# Patient Record
Sex: MALE | Race: WHITE | Employment: FULL TIME | URBAN - METROPOLITAN AREA
[De-identification: names, ages, dates, MRNs, and addresses within clinical notes are randomized per-mention and may not be internally consistent; named-entity substitution may affect disease eponyms.]

---

## 2018-02-11 PROBLEM — E66.9 OBESITY: Status: ACTIVE | Noted: 2018-02-11

## 2018-02-11 PROBLEM — E11.9 DM (DIABETES MELLITUS) (HCC): Status: ACTIVE | Noted: 2018-02-11

## 2018-02-11 PROBLEM — I10 HTN (HYPERTENSION): Status: ACTIVE | Noted: 2018-02-11

## 2018-02-11 PROBLEM — R06.02 SHORTNESS OF BREATH: Status: ACTIVE | Noted: 2018-02-11

## 2018-02-12 PROBLEM — I50.9 ACUTE CHF (HCC): Status: ACTIVE | Noted: 2018-02-12

## 2018-02-16 DIAGNOSIS — I42.8 NON-ISCHEMIC CARDIOMYOPATHY (HCC): Primary | ICD-10-CM

## 2018-02-16 DIAGNOSIS — R06.02 SHORTNESS OF BREATH: ICD-10-CM

## 2018-02-19 ENCOUNTER — TELEPHONE (OUTPATIENT)
Dept: TELEMETRY | Age: 53
End: 2018-02-19

## 2018-02-22 ENCOUNTER — OFFICE VISIT (OUTPATIENT)
Dept: CARDIOLOGY CLINIC | Age: 53
End: 2018-02-22

## 2018-02-22 VITALS
HEART RATE: 95 BPM | DIASTOLIC BLOOD PRESSURE: 62 MMHG | SYSTOLIC BLOOD PRESSURE: 116 MMHG | OXYGEN SATURATION: 99 % | WEIGHT: 240 LBS | HEIGHT: 72 IN | BODY MASS INDEX: 32.51 KG/M2

## 2018-02-22 DIAGNOSIS — I50.22 CHRONIC SYSTOLIC HEART FAILURE (HCC): ICD-10-CM

## 2018-02-22 DIAGNOSIS — I25.10 MILD CAD: ICD-10-CM

## 2018-02-22 DIAGNOSIS — R06.02 SHORTNESS OF BREATH: ICD-10-CM

## 2018-02-22 DIAGNOSIS — I42.8 NON-ISCHEMIC CARDIOMYOPATHY (HCC): Primary | ICD-10-CM

## 2018-02-22 DIAGNOSIS — I10 ESSENTIAL HYPERTENSION: ICD-10-CM

## 2018-02-22 DIAGNOSIS — E11.9 TYPE 2 DIABETES MELLITUS WITHOUT COMPLICATION, WITHOUT LONG-TERM CURRENT USE OF INSULIN (HCC): ICD-10-CM

## 2018-02-22 PROCEDURE — 99214 OFFICE O/P EST MOD 30 MIN: CPT | Performed by: NURSE PRACTITIONER

## 2018-02-22 RX ORDER — CARVEDILOL 6.25 MG/1
6.25 TABLET ORAL 2 TIMES DAILY WITH MEALS
Qty: 60 TABLET | Refills: 1 | Status: SHIPPED | OUTPATIENT
Start: 2018-02-26 | End: 2018-03-08 | Stop reason: SDUPTHER

## 2018-02-22 RX ORDER — VALSARTAN 40 MG/1
40 TABLET ORAL NIGHTLY
Qty: 90 TABLET | Refills: 1 | Status: SHIPPED | OUTPATIENT
Start: 2018-02-22 | End: 2018-03-08 | Stop reason: SDUPTHER

## 2018-02-22 NOTE — PROGRESS NOTES
distress  HEENT: non-icteric sclera, oropharynx without exudate, oral mucosa moist  Neck: JVP less than 8 cm H20  Respiratory:  · No use of accessory muscles  · Clear breath sounds throughout, no wheezing, no crackles, no rhonchi  Cardiovascular:  · The apical impulses not displaced; life vest in place  · Heart tones are crisp and normal, no murmur/rub/gallop  · Regular rate and rhythm, S1,S2 normal  · Radial pulses 2+ and equal bilaterally  · No edema  · Pedal Pulses: 2+ and equal   Abdomen:  · No masses or tenderness  · Liver: No Abnormalities Noted  Musculoskeletal/Skin:  · Exhibits normal gait balance and coordination  · There is no clubbing, cyanosis of the extremities  · Skin is warm and dry  · Moves all extremities well  Neurological/Psychiatric:  · Alert and oriented in all spheres  · No abnormalities of mood, affect, memory, mentation, or behavior are noted    Lab Data:  CBC:   Lab Results   Component Value Date    WBC 9.3 02/12/2018    WBC 9.3 02/11/2018    RBC 4.73 02/12/2018    RBC 5.02 02/11/2018    HGB 13.2 02/12/2018    HGB 14.1 02/11/2018    HCT 40.3 02/12/2018    HCT 43.1 02/11/2018    MCV 85.2 02/12/2018    MCV 85.9 02/11/2018    RDW 13.7 02/12/2018    RDW 13.8 02/11/2018     02/12/2018     02/11/2018     BMP:   Lab Results   Component Value Date     02/16/2018     02/15/2018     02/14/2018    K 4.3 02/16/2018    K 4.0 02/15/2018    K 3.6 02/14/2018    CL 93 02/16/2018    CL 89 02/15/2018    CL 92 02/14/2018    CO2 34 02/16/2018    CO2 32 02/15/2018    CO2 29 02/14/2018    BUN 25 02/16/2018    BUN 26 02/15/2018    BUN 25 02/14/2018    CREATININE 0.9 02/16/2018    CREATININE 1.0 02/15/2018    CREATININE 1.1 02/14/2018     BNP:   Lab Results   Component Value Date    PROBNP 935 02/16/2018    PROBNP 1,042 02/15/2018    PROBNP 1,651 02/14/2018       Recent Testing:  [facility] 2/13/18 ([place])  HEMODYNAMICS:  1.  PA 58/31, mean of 40, mean PCWP 27, RV 52, RVEDP 13, mean RA 11 ventricular size is moderately   dilated. Elevated left ventricular diastolic filling pressure. -- Moderate mitral regurgitation. No stenosis. -- Severely dilated left and right atria. -- The right ventricle is moderately enlarged. Right ventricular systolic   function is mildly reduced. TAPSE = 15 mm. Pertinent Problems:     Assessment/Plan:  · Bi-ventricular heart failure. Systolic and diastolic heart failure. NYHA Class II              - non-ischemic cardiomyopathy; ? Medication related as he was taking adderall for the last 3 years, history of body building and previous used steroids about 10 years ago per report. I recommended no adderall at discharge with cardiomyopathy              - LVEF 17%  · Mild CAD  · Pulmonary hypertension due to heart failure  · Hypertension  · diabetes    Visit Diagnosis:    1. Non-ischemic cardiomyopathy ([facility]    2. Shortness of breath    3. Chronic systolic heart failure ([facility]    4. Mild CAD    5. Essential hypertension    6. Type 2 diabetes mellitus without complication, without long-term current use of insulin [facility]        Plan:   1. Check BMP and BNP today and again next week  2. Increase valsartan to 40mg nightly for heart function  3. Increase coreg starting Monday to 6.25mg twice a day with food  4. Continue daily weights  5. Follow up 2 weeks for additional medication adjustment  6. Continue Life vest as ordered by Dr. Clayton Antoine for low EF. Will recommend repeat Echocardiogram in 90 days after titration of GDMT. If EF still <35% will have patient evaluated by EP for ICD. Core measures for HFrEF:  EF: 17%  ICD: Life vest  ACEi/ARB/ARNI: valsartan 40mg daily   BB: coreg 6.25 mg BID  Yeison: can consider adding  Hydralazine/nitrates: NA    QUALITY MEASURES  1. Tobacco Cessation Counseling: NA  2. Retake of BP if >140/90:   NA  3. Documentation to PCP/referring for new patient:  Sent to PCP at close of office visit  4. CAD patient on anti-platelet: Yes  5.  CAD patient on STATIN therapy:  Yes  6. Patient with CHF and aFib on anticoagulation:  NA     I appreciate the opportunity for caring for this patient.      India Guzman CNP, 2/22/2018, 2:57 PM

## 2018-02-22 NOTE — PATIENT INSTRUCTIONS
Plan:   1. Check BMP and BNP today and again next week  2. Increase valsartan to 40mg nightly for heart function  3. Increase coreg starting Monday to 6.25mg twice a day with food  4. Continue daily weights  5.  Follow up 2 weeks for additional medication adjustment

## 2018-02-28 ENCOUNTER — TELEPHONE (OUTPATIENT)
Dept: CARDIOLOGY CLINIC | Age: 53
End: 2018-02-28

## 2018-03-01 ENCOUNTER — TELEPHONE (OUTPATIENT)
Dept: CARDIOLOGY CLINIC | Age: 53
End: 2018-03-01

## 2018-03-05 ENCOUNTER — TELEPHONE (OUTPATIENT)
Dept: CARDIOLOGY CLINIC | Age: 53
End: 2018-03-05

## 2018-03-05 NOTE — TELEPHONE ENCOUNTER
Pt has gained 3 lb over 2 days. Pt was to go to work today but was not feeling well enough. Minor swelling feet. Denies SOB. NO B/P taken. No dizziness. Please advise.

## 2018-03-07 ENCOUNTER — TELEPHONE (OUTPATIENT)
Dept: CARDIOLOGY CLINIC | Age: 53
End: 2018-03-07

## 2018-03-08 ENCOUNTER — OFFICE VISIT (OUTPATIENT)
Dept: CARDIOLOGY CLINIC | Age: 53
End: 2018-03-08

## 2018-03-08 ENCOUNTER — HOSPITAL ENCOUNTER (OUTPATIENT)
Dept: OTHER | Age: 53
Discharge: HOME | End: 2018-03-31
Attending: NURSE PRACTITIONER | Admitting: NURSE PRACTITIONER

## 2018-03-08 VITALS
DIASTOLIC BLOOD PRESSURE: 72 MMHG | HEIGHT: 72 IN | WEIGHT: 240 LBS | HEART RATE: 106 BPM | BODY MASS INDEX: 32.51 KG/M2 | OXYGEN SATURATION: 98 % | SYSTOLIC BLOOD PRESSURE: 120 MMHG

## 2018-03-08 DIAGNOSIS — I10 ESSENTIAL HYPERTENSION: ICD-10-CM

## 2018-03-08 DIAGNOSIS — I42.8 NON-ISCHEMIC CARDIOMYOPATHY (HCC): Primary | ICD-10-CM

## 2018-03-08 DIAGNOSIS — I50.22 CHRONIC SYSTOLIC HEART FAILURE (HCC): ICD-10-CM

## 2018-03-08 DIAGNOSIS — R06.02 SHORTNESS OF BREATH: ICD-10-CM

## 2018-03-08 LAB
ANION GAP SERPL CALCULATED.3IONS-SCNC: 14 MMOL/L (ref 3–16)
BUN BLDV-MCNC: 16 MG/DL (ref 7–20)
CALCIUM SERPL-MCNC: 9.5 MG/DL (ref 8.3–10.6)
CHLORIDE BLD-SCNC: 97 MMOL/L (ref 99–110)
CO2: 29 MMOL/L (ref 21–32)
CREAT SERPL-MCNC: 0.8 MG/DL (ref 0.9–1.3)
GFR AFRICAN AMERICAN: >60
GFR NON-AFRICAN AMERICAN: >60
GLUCOSE BLD-MCNC: 261 MG/DL (ref 70–99)
POTASSIUM SERPL-SCNC: 4.9 MMOL/L (ref 3.5–5.1)
PRO-BNP: 1589 PG/ML (ref 0–124)
SODIUM BLD-SCNC: 140 MMOL/L (ref 136–145)

## 2018-03-08 PROCEDURE — 99214 OFFICE O/P EST MOD 30 MIN: CPT | Performed by: NURSE PRACTITIONER

## 2018-03-08 RX ORDER — VALSARTAN 80 MG/1
80 TABLET ORAL NIGHTLY
Qty: 30 TABLET | Refills: 1 | Status: SHIPPED | OUTPATIENT
Start: 2018-03-08 | End: 2018-03-20 | Stop reason: SDUPTHER

## 2018-03-08 RX ORDER — CARVEDILOL 12.5 MG/1
12.5 TABLET ORAL 2 TIMES DAILY WITH MEALS
Qty: 60 TABLET | Refills: 1 | Status: SHIPPED | OUTPATIENT
Start: 2018-03-08 | End: 2018-03-20 | Stop reason: SDUPTHER

## 2018-03-08 NOTE — PATIENT INSTRUCTIONS
1. Continue daily weight  2. Increase coreg to 12.5mg twice a day with food starting on Saturday (okay to take 2 tabs of the 6.25mg twice a day until you run low and then  script for the 12.5mg tablet)  3. Increase Valsartan 80mg nightly starting tonight   4. Repeat labs in 2 weeks   5.  Follow up with Dr. Alisia Ramirez as planned

## 2018-03-08 NOTE — PROGRESS NOTES
[address]   Cardiac Follow-up    Primary Care Doctor:  Sagrario Martin PA-C    Chief Complaint   Patient presents with    Cardiomyopathy        History of Present Illness:   I had the pleasure of seeing Mortimer Bar in follow up for  systolic heart failure, (LVEF 17%), non-ischemic cardiomyopathy, non-obstructive CAD, HTN, DM. Admitted from 2/11/18-2/16/18 with worsening shortness of breath, orthopnea, unable to work due to dyspnea. Echo showed decreased LVEF, cardiac cath as below. He was started on lasix infusion and milrinone with improved diuresis. Admission weight was 255lbs down to 228lbs at discharge. He was started on digoxin, low dose valsartan 20mg due to lower blood pressures, coreg 3.125mg BID. He was also discharged with a life vest due to severe cardiomyopathy. OV 2/22/18: Weight at home was 232lbs this morning. Home weight yesterday was 230lbs. Since last visit, he continues on valsartan 40mg daily and he continued on coreg 6.25mg BID. Home weight was 238lbs. He is trying to stay active at the gym, started with some leg swelling recently in the last 1 week. He isn't on a water pill. He hasn't increased the valsartan 80mg daily yet. No lightheadedness or dizziness. No shortness of breath. No palpitations. Some fatigue. He is watching his salt intake and his fluid intake. Mortimer Bar describes symptoms including some fatigue but denies chest pain, dyspnea, palpitations, orthopnea, early saiety, syncope. NYHA:   II  ACC/ AHA Stage:    C    Past Medical History:   has a past medical history of Acute CHF ([facility] Coronary artery disease involving native coronary artery of native heart without angina pectoris; Diabetes mellitus ([facility] [facility] Hypertension; and Systolic heart failure ([facility] [facility] Surgical History:   has no past surgical history on file. Social History:   reports that he has never smoked.  He has never used smokeless tobacco. He reports that he does not (1.829 m)        Constitutional and General Appearance: no apparent distress  HEENT: non-icteric sclera, oropharynx without exudate, oral mucosa moist  Neck: JVP less than 8 cm H20  Respiratory:  · No use of accessory muscles  · Clear breath sounds throughout, no wheezing, no crackles, no rhonchi  Cardiovascular:  · The apical impulses not displaced; life vest in place  · Heart tones are crisp and normal, no murmur/rub/gallop  · Regular rate and rhythm, S1,S2 normal  · Radial pulses 2+ and equal bilaterally  · Trace left leg edema, none on the right  · Pedal Pulses: 2+ and equal   Abdomen:  · No masses or tenderness  · Liver: No Abnormalities Noted  Musculoskeletal/Skin:  · Exhibits normal gait balance and coordination  · There is no clubbing, cyanosis of the extremities  · Skin is warm and dry  · Moves all extremities well  Neurological/Psychiatric:  · Alert and oriented in all spheres  · No abnormalities of mood, affect, memory, mentation, or behavior are noted    Lab Data:  CBC:   Lab Results   Component Value Date    WBC 9.3 02/12/2018    WBC 9.3 02/11/2018    RBC 4.73 02/12/2018    RBC 5.02 02/11/2018    HGB 13.2 02/12/2018    HGB 14.1 02/11/2018    HCT 40.3 02/12/2018    HCT 43.1 02/11/2018    MCV 85.2 02/12/2018    MCV 85.9 02/11/2018    RDW 13.7 02/12/2018    RDW 13.8 02/11/2018     02/12/2018     02/11/2018     BMP:   Lab Results   Component Value Date     02/16/2018     02/15/2018     02/14/2018    K 4.3 02/16/2018    K 4.0 02/15/2018    K 3.6 02/14/2018    CL 93 02/16/2018    CL 89 02/15/2018    CL 92 02/14/2018    CO2 34 02/16/2018    CO2 32 02/15/2018    CO2 29 02/14/2018    BUN 25 02/16/2018    BUN 26 02/15/2018    BUN 25 02/14/2018    CREATININE 0.9 02/16/2018    CREATININE 1.0 02/15/2018    CREATININE 1.1 02/14/2018     BNP:   Lab Results   Component Value Date    PROBNP 935 02/16/2018    PROBNP 1,042 02/15/2018    PROBNP 1,651 02/14/2018       Recent Testing:  [facility] 2/13/18 ([place])  HEMODYNAMICS:  1.  PA 58/31, mean of 40, mean PCWP 27, RV 52, RVEDP 13, mean RA 11 mmHg. 2.  LVEDP was 26, , /87, mean of 101 mmHg. 3.  Saturation AO 98%, PA 56%, RA 59%, SVC 57%. 4.  Cardiac index by Edinson 1.7 L/min/m2, cardiac output 4 L/min. 5.  PVR 3.2 Woods units and SVR was 1420 dynes per centimeter-second. LV gram was not done due to the recent estimation of the ejection fraction. EF on the echocardiogram done yesterday was 17%. CORONARY ANGIOGRAPHY:  1. The left main was a large-caliber vessel that trifurcated, it was  normal.     2.  The left circumflex was a medium-caliber vessel that gave off two  obtuse marginal branches, the first was medium-caliber, the second was  large. First OM had luminal irregularities. 3.  LAD was large caliber vessel that reached the apex. There were luminal  irregularities noted in the mid LAD. 4.  Ramus was a large-caliber vessel that had 20% ostial stenosis. 5.  Right coronary artery was large and dominant and had 40% mid segment  stenosis. It gave off a medium-caliber right posterior descending artery  and three small right posterolateral branches. A small caliber right  ventricular branch also supplied right posterior descending artery  territory     IMPRESSION:  1. Mild CAD of mid RCA, first OM and mid LAD. 2.  Nonischemic dilated cardiomyopathy with EF of 17% on echocardiogram.  3.  Severe pulmonary hypertension. 4.  Moderately elevated left ventricular filling pressure. 5.  Moderately elevated right-sided filling pressures. 6.  Severely reduced cardiac index. 7.  PVR and PCWP suggest pulmonary hypertension is of mixed etiology of   pulmonary disease as well as left heart failure. Echo 2/12/18  Summary   There is no echo for comparison. -- The left ventricular systolic function is severely reduced with an   ejection fraction of 17 % by Saba's. Visually LVEF appears to be 10-15%.    There is global visit  4. CAD patient on anti-platelet: Yes  5. CAD patient on STATIN therapy:  Yes  6. Patient with CHF and aFib on anticoagulation:  NA     I appreciate the opportunity for caring for this patient.      Temo Henao CNP, 3/8/2018, 2:47 PM

## 2018-03-08 NOTE — LETTER
[address] Cardiology - [address] [place] [id number] [place] [address]. [id number]  Phone: [phone]  Fax: [phone]    Fercho Mendes CNP        March 13, 2018     Jesus Manuel Church, [address] [place] [id number]    Patient: Mame Vora  MR Number: [id number]  YOB: 1965  Date of Visit: 3/8/2018    Dear Dr. Jesus Manuel Church:    Thank you for the request for consultation for Gail Vo to me for the evaluation. Below are the relevant portions of my assessment and plan of care. [address]   Cardiac Follow-up    Primary Care Doctor:  Jesus Manuel Church PA-C    Chief Complaint   Patient presents with    Cardiomyopathy        History of Present Illness:   I had the pleasure of seeing Mame Vora in follow up for  systolic heart failure, (LVEF 17%), non-ischemic cardiomyopathy, non-obstructive CAD, HTN, DM. Admitted from 2/11/18-2/16/18 with worsening shortness of breath, orthopnea, unable to work due to dyspnea. Echo showed decreased LVEF, cardiac cath as below. He was started on lasix infusion and milrinone with improved diuresis. Admission weight was 255lbs down to 228lbs at discharge. He was started on digoxin, low dose valsartan 20mg due to lower blood pressures, coreg 3.125mg BID. He was also discharged with a life vest due to severe cardiomyopathy. OV 2/22/18: Weight at home was 232lbs this morning. Home weight yesterday was 230lbs. Since last visit, he continues on valsartan 40mg daily and he continued on coreg 6.25mg BID. Home weight was 238lbs. He is trying to stay active at the gym, started with some leg swelling recently in the last 1 week. He isn't on a water pill. He hasn't increased the valsartan 80mg daily yet. No lightheadedness or dizziness. No shortness of breath. No palpitations. Some fatigue. He is watching his salt intake and his fluid intake.      Mame Vora describes symptoms including some fatigue but denies  02/16/2018     02/15/2018     02/14/2018    K 4.3 02/16/2018    K 4.0 02/15/2018    K 3.6 02/14/2018    CL 93 02/16/2018    CL 89 02/15/2018    CL 92 02/14/2018    CO2 34 02/16/2018    CO2 32 02/15/2018    CO2 29 02/14/2018    BUN 25 02/16/2018    BUN 26 02/15/2018    BUN 25 02/14/2018    CREATININE 0.9 02/16/2018    CREATININE 1.0 02/15/2018    CREATININE 1.1 02/14/2018     BNP:   Lab Results   Component Value Date    PROBNP 935 02/16/2018    PROBNP 1,042 02/15/2018    PROBNP 1,651 02/14/2018       Recent Testing:  [facility] 2/13/18 ([place])  HEMODYNAMICS:  1.  PA 58/31, mean of 40, mean PCWP 27, RV 52, RVEDP 13, mean RA 11 mmHg. 2.  LVEDP was 26, , /87, mean of 101 mmHg. 3.  Saturation AO 98%, PA 56%, RA 59%, SVC 57%. 4.  Cardiac index by Edinson 1.7 L/min/m2, cardiac output 4 L/min. 5.  PVR 3.2 Woods units and SVR was 1420 dynes per centimeter-second. LV gram was not done due to the recent estimation of the ejection fraction. EF on the echocardiogram done yesterday was 17%. CORONARY ANGIOGRAPHY:  1. The left main was a large-caliber vessel that trifurcated, it was  normal.     2.  The left circumflex was a medium-caliber vessel that gave off two  obtuse marginal branches, the first was medium-caliber, the second was  large. First OM had luminal irregularities. 3.  LAD was large caliber vessel that reached the apex. There were luminal  irregularities noted in the mid LAD. 4.  Ramus was a large-caliber vessel that had 20% ostial stenosis. 5.  Right coronary artery was large and dominant and had 40% mid segment  stenosis. It gave off a medium-caliber right posterior descending artery  and three small right posterolateral branches. A small caliber right  ventricular branch also supplied right posterior descending artery  territory     IMPRESSION:  1. Mild CAD of mid RCA, first OM and mid LAD.   2.  Nonischemic dilated cardiomyopathy with EF of 17% on echocardiogram. 3.  Severe pulmonary hypertension. 4.  Moderately elevated left ventricular filling pressure. 5.  Moderately elevated right-sided filling pressures. 6.  Severely reduced cardiac index. 7.  PVR and PCWP suggest pulmonary hypertension is of mixed etiology of   pulmonary disease as well as left heart failure. Echo 2/12/18  Summary   There is no echo for comparison. -- The left ventricular systolic function is severely reduced with an   ejection fraction of 17 % by Saba's. Visually LVEF appears to be 10-15%. There is global hypokinesis with regional variations. There is mild   concentric left ventricular hypertrophy. Left ventricular size is moderately   dilated. Elevated left ventricular diastolic filling pressure. -- Moderate mitral regurgitation. No stenosis. -- Severely dilated left and right atria. -- The right ventricle is moderately enlarged. Right ventricular systolic   function is mildly reduced. TAPSE = 15 mm. Pertinent Problems:     Assessment/Plan:  · Bi-ventricular heart failure. Systolic and diastolic heart failure. NYHA Class II              - non-ischemic cardiomyopathy; ? Medication related as he was taking adderall for the last 3 years, history of body building and previous used steroids about 10 years ago per report. I recommended no adderall at discharge with cardiomyopathy              - LVEF 17%  · Mild CAD  · Pulmonary hypertension due to heart failure  · Hypertension  · diabetes    Visit Diagnosis:    1. Non-ischemic cardiomyopathy ([address]    2. Essential hypertension    3. Chronic systolic heart failure ([address]    4. Shortness of breath        Plan:   1. Continue daily weight  2. Increase coreg to 12.5mg twice a day with food starting on Saturday (okay to take 2 tabs of the 6.25mg twice a day until you run low and then  script for the 12.5mg tablet)  3. Increase Valsartan 80mg nightly starting tonight   4.  Repeat labs in 2 weeks 5. Follow up with Dr. Phillip Valencia as planned   Continue Life vest as ordered by Dr. Brenden Curran for low EF. Will recommend repeat Echocardiogram in 90 days after titration of GDMT. If EF still <35% will have patient evaluated by EP for ICD. Core measures for HFrEF:  EF: 17%  ICD: Life vest  ACEi/ARB/ARNI: valsartan 80mg daily   BB: coreg 12.5 mg BID  Yeison: can consider adding  Hydralazine/nitrates: NA    QUALITY MEASURES  1. Tobacco Cessation Counseling: NA  2. Retake of BP if >140/90:   NA  3. Documentation to PCP/referring for new patient:  Sent to PCP at close of office visit  4. CAD patient on anti-platelet: Yes  5. CAD patient on STATIN therapy:  Yes  6. Patient with CHF and aFib on anticoagulation:  NA     I appreciate the opportunity for caring for this patient. Navin Bello CNP, 3/8/2018, 2:47 PM      If you have questions, please do not hesitate to call me. I look forward to following Alejandra Macias along with you.     Sincerely,        Navin Bello CNP

## 2018-03-12 ENCOUNTER — TELEPHONE (OUTPATIENT)
Dept: CARDIOLOGY CLINIC | Age: 53
End: 2018-03-12

## 2018-03-12 NOTE — TELEPHONE ENCOUNTER
----- Message from Reyna Fuller CNP sent at 3/9/2018  3:25 PM EST -----  Heart failure number is up. Kidney labs look good. He needs to decrease weight lifting to NO more than 30lbs due to heart function and worsening number.

## 2018-03-13 NOTE — COMMUNICATION BODY
2/13/18 ([place])  HEMODYNAMICS:  1.  PA 58/31, mean of 40, mean PCWP 27, RV 52, RVEDP 13, mean RA 11 mmHg. 2.  LVEDP was 26, , /87, mean of 101 mmHg. 3.  Saturation AO 98%, PA 56%, RA 59%, SVC 57%. 4.  Cardiac index by Edinson 1.7 L/min/m2, cardiac output 4 L/min. 5.  PVR 3.2 Woods units and SVR was 1420 dynes per centimeter-second. LV gram was not done due to the recent estimation of the ejection fraction. EF on the echocardiogram done yesterday was 17%. CORONARY ANGIOGRAPHY:  1. The left main was a large-caliber vessel that trifurcated, it was  normal.     2.  The left circumflex was a medium-caliber vessel that gave off two  obtuse marginal branches, the first was medium-caliber, the second was  large. First OM had luminal irregularities. 3.  LAD was large caliber vessel that reached the apex. There were luminal  irregularities noted in the mid LAD. 4.  Ramus was a large-caliber vessel that had 20% ostial stenosis. 5.  Right coronary artery was large and dominant and had 40% mid segment  stenosis. It gave off a medium-caliber right posterior descending artery  and three small right posterolateral branches. A small caliber right  ventricular branch also supplied right posterior descending artery  territory     IMPRESSION:  1. Mild CAD of mid RCA, first OM and mid LAD. 2.  Nonischemic dilated cardiomyopathy with EF of 17% on echocardiogram.  3.  Severe pulmonary hypertension. 4.  Moderately elevated left ventricular filling pressure. 5.  Moderately elevated right-sided filling pressures. 6.  Severely reduced cardiac index. 7.  PVR and PCWP suggest pulmonary hypertension is of mixed etiology of   pulmonary disease as well as left heart failure. Echo 2/12/18  Summary   There is no echo for comparison. -- The left ventricular systolic function is severely reduced with an   ejection fraction of 17 % by Saba's. Visually LVEF appears to be 10-15%.    There is global visit  4. CAD patient on anti-platelet: Yes  5. CAD patient on STATIN therapy:  Yes  6. Patient with CHF and aFib on anticoagulation:  NA     I appreciate the opportunity for caring for this patient.      Surendra Collins CNP, 3/8/2018, 2:47 PM

## 2018-03-19 LAB
ANION GAP SERPL CALCULATED.3IONS-SCNC: 18 MMOL/L (ref 3–16)
BUN BLDV-MCNC: 19 MG/DL (ref 7–20)
CALCIUM SERPL-MCNC: 9 MG/DL (ref 8.3–10.6)
CHLORIDE BLD-SCNC: 96 MMOL/L (ref 99–110)
CO2: 25 MMOL/L (ref 21–32)
CREAT SERPL-MCNC: 0.7 MG/DL (ref 0.9–1.3)
GFR AFRICAN AMERICAN: >60
GFR NON-AFRICAN AMERICAN: >60
GLUCOSE BLD-MCNC: 291 MG/DL (ref 70–99)
POTASSIUM SERPL-SCNC: 5 MMOL/L (ref 3.5–5.1)
PRO-BNP: 679 PG/ML (ref 0–124)
SODIUM BLD-SCNC: 139 MMOL/L (ref 136–145)

## 2018-03-20 ENCOUNTER — OFFICE VISIT (OUTPATIENT)
Dept: CARDIOLOGY CLINIC | Age: 53
End: 2018-03-20

## 2018-03-20 ENCOUNTER — TELEPHONE (OUTPATIENT)
Dept: CARDIOLOGY CLINIC | Age: 53
End: 2018-03-20

## 2018-03-20 VITALS
HEART RATE: 81 BPM | BODY MASS INDEX: 32.51 KG/M2 | HEIGHT: 72 IN | OXYGEN SATURATION: 99 % | WEIGHT: 240 LBS | DIASTOLIC BLOOD PRESSURE: 84 MMHG | SYSTOLIC BLOOD PRESSURE: 130 MMHG

## 2018-03-20 DIAGNOSIS — I25.10 CORONARY ARTERY DISEASE INVOLVING NATIVE CORONARY ARTERY OF NATIVE HEART WITHOUT ANGINA PECTORIS: ICD-10-CM

## 2018-03-20 DIAGNOSIS — I10 ESSENTIAL HYPERTENSION: ICD-10-CM

## 2018-03-20 DIAGNOSIS — I42.8 NON-ISCHEMIC CARDIOMYOPATHY (HCC): ICD-10-CM

## 2018-03-20 DIAGNOSIS — I50.22 SYSTOLIC CHF, CHRONIC (HCC): Primary | ICD-10-CM

## 2018-03-20 DIAGNOSIS — R06.02 SHORTNESS OF BREATH: ICD-10-CM

## 2018-03-20 PROBLEM — I50.9 ACUTE CONGESTIVE HEART FAILURE (HCC): Status: RESOLVED | Noted: 2018-02-12 | Resolved: 2018-03-20

## 2018-03-20 PROCEDURE — 99204 OFFICE O/P NEW MOD 45 MIN: CPT | Performed by: INTERNAL MEDICINE

## 2018-03-20 RX ORDER — ATORVASTATIN CALCIUM 40 MG/1
40 TABLET, FILM COATED ORAL NIGHTLY
Qty: 90 TABLET | Refills: 3 | Status: SHIPPED | OUTPATIENT
Start: 2018-03-20 | End: 2019-02-26 | Stop reason: SDUPTHER

## 2018-03-20 RX ORDER — CARVEDILOL 12.5 MG/1
12.5 TABLET ORAL 2 TIMES DAILY WITH MEALS
Qty: 180 TABLET | Refills: 3 | Status: SHIPPED | OUTPATIENT
Start: 2018-03-20 | End: 2019-02-26 | Stop reason: SDUPTHER

## 2018-03-20 RX ORDER — DIGOXIN 125 MCG
125 TABLET ORAL DAILY
Qty: 90 TABLET | Refills: 3 | Status: SHIPPED | OUTPATIENT
Start: 2018-03-20 | End: 2018-11-27 | Stop reason: SDUPTHER

## 2018-03-20 RX ORDER — VALSARTAN 160 MG/1
160 TABLET ORAL NIGHTLY
Qty: 90 TABLET | Refills: 3 | Status: SHIPPED | OUTPATIENT
Start: 2018-03-20 | End: 2018-11-27 | Stop reason: SDUPTHER

## 2018-03-20 NOTE — PROGRESS NOTES
[address]  Advanced CHF/Pulmonary Hypertension   Cardiac Evaluation      Pati Smith  YOB: 1965    Date of Visit:  3/20/18      Chief Complaint   Patient presents with    New Patient     pt is wearing a lifevest    Coronary Artery Disease    Hypertension    Cardiomyopathy    Discuss Labs     bnp,bmp 3/8/2018    Fatigue     pt has been napping duringthe day. He does go to the gym with 30 lb weight limit. History of Present Illness:  Pati Smith is a 47 y.o. male who presents for hospital follow up and for consultation and management of CM, CAD and HTN referred by Dr. Erika Aranda. Admitted from 2/11/18-2/16/18 with worsening shortness of breath, orthopnea, unable to work due to dyspnea. Echo showed decreased LVEF, cardiac cath as below. He was started on lasix infusion and milrinone with improved diuresis. Admission weight was 255lbs down to 228lbs at discharge. He was started on digoxin, low dose valsartan 20mg due to lower blood pressures, coreg 3.125mg BID. He was also discharged with a life vest due to severe cardiomyopathy. LHC was performed on 2/13/18 by Dr. Erika Aranda. Echo on 2/18/18 shows EF of 10-15%. Today he reports he is feeling ok. He reports not taking his Adderall any longer. He continues to work out and reports only lifting 30 lbs. He is watching his diet/sodium more closely. He continues to wear the life vest. He is wanting to go back to work. He is a . He denies chest pain, sob, palpitations, dizziness or syncope.      No Known Allergies  Current Outpatient Prescriptions   Medication Sig Dispense Refill    carvedilol (COREG) 12.5 MG tablet Take 1 tablet by mouth 2 times daily (with meals) 60 tablet 1    valsartan (DIOVAN) 80 MG tablet Take 1 tablet by mouth nightly 30 tablet 1    aspirin 81 MG EC tablet Take 1 tablet by mouth daily 30 tablet 0    atorvastatin (LIPITOR) 40 MG tablet Take 1 tablet by mouth nightly 30 tablet 0   

## 2018-03-23 ENCOUNTER — TELEPHONE (OUTPATIENT)
Dept: CARDIOLOGY CLINIC | Age: 53
End: 2018-03-23

## 2018-03-23 NOTE — TELEPHONE ENCOUNTER
pt needs work restriction letter fax to his employer for when he returns on 04/2/2018 fax to [phone]

## 2018-03-23 NOTE — TELEPHONE ENCOUNTER
I thought we took care of this at the visit. Go ahead and send letter. See weight restrictions in note.   MARSHALL

## 2018-03-23 NOTE — LETTER
[address] [place] [address] - [address] [facility] [address]  Phone: [phone]  Fax: [phone]    Raphael Bloom MD        March 23, 2018     [address] [place] 47 y.o. male 1965 may return to work 4/2/2018. He may not lift more than 30 pounds. If you have any questions or concerns, please don't hesitate to call.     Sincerely,      Raphael Bloom MD

## 2018-03-23 NOTE — COMMUNICATION BODY
[address]  Advanced CHF/Pulmonary Hypertension   Cardiac Evaluation      Glenn Garcia  YOB: 1965    Date of Visit:  3/20/18      Chief Complaint   Patient presents with    New Patient     pt is wearing a lifevest    Coronary Artery Disease    Hypertension    Cardiomyopathy    Discuss Labs     bnp,bmp 3/8/2018    Fatigue     pt has been napping duringthe day. He does go to the gym with 30 lb weight limit. History of Present Illness:  Glenn Garcia is a 47 y.o. male who presents for hospital follow up and for consultation and management of CM, CAD and HTN referred by Dr. Max Johnson. Admitted from 2/11/18-2/16/18 with worsening shortness of breath, orthopnea, unable to work due to dyspnea. Echo showed decreased LVEF, cardiac cath as below. He was started on lasix infusion and milrinone with improved diuresis. Admission weight was 255lbs down to 228lbs at discharge. He was started on digoxin, low dose valsartan 20mg due to lower blood pressures, coreg 3.125mg BID. He was also discharged with a life vest due to severe cardiomyopathy. LHC was performed on 2/13/18 by Dr. Max Johnson. Echo on 2/18/18 shows EF of 10-15%. Today he reports he is feeling ok. He reports not taking his Adderall any longer. He continues to work out and reports only lifting 30 lbs. He is watching his diet/sodium more closely. He continues to wear the life vest. He is wanting to go back to work. He is a . He denies chest pain, sob, palpitations, dizziness or syncope.      No Known Allergies  Current Outpatient Prescriptions   Medication Sig Dispense Refill    carvedilol (COREG) 12.5 MG tablet Take 1 tablet by mouth 2 times daily (with meals) 60 tablet 1    valsartan (DIOVAN) 80 MG tablet Take 1 tablet by mouth nightly 30 tablet 1    aspirin 81 MG EC tablet Take 1 tablet by mouth daily 30 tablet 0    atorvastatin (LIPITOR) 40 MG tablet Take 1 tablet by mouth nightly 30 tablet 0   

## 2018-03-27 LAB
ANION GAP SERPL CALCULATED.3IONS-SCNC: 16 MMOL/L (ref 3–16)
BUN BLDV-MCNC: 14 MG/DL (ref 7–20)
CALCIUM SERPL-MCNC: 9.3 MG/DL (ref 8.3–10.6)
CHLORIDE BLD-SCNC: 96 MMOL/L (ref 99–110)
CO2: 27 MMOL/L (ref 21–32)
CREAT SERPL-MCNC: 0.7 MG/DL (ref 0.9–1.3)
GFR AFRICAN AMERICAN: >60
GFR NON-AFRICAN AMERICAN: >60
GLUCOSE BLD-MCNC: 229 MG/DL (ref 70–99)
POTASSIUM SERPL-SCNC: 4.9 MMOL/L (ref 3.5–5.1)
PRO-BNP: 265 PG/ML (ref 0–124)
SODIUM BLD-SCNC: 139 MMOL/L (ref 136–145)

## 2018-03-28 ENCOUNTER — TELEPHONE (OUTPATIENT)
Dept: CARDIOLOGY CLINIC | Age: 53
End: 2018-03-28

## 2018-03-28 NOTE — TELEPHONE ENCOUNTER
Attempted to contact pt. Pt was not accepting calls at this time. Will try to contact at a later date and time. Will relay lab results per [facility] once we get a hold of [place].

## 2018-04-01 ENCOUNTER — HOSPITAL ENCOUNTER (OUTPATIENT)
Dept: OTHER | Age: 53
Discharge: HOME | End: 2018-04-30
Attending: NURSE PRACTITIONER | Admitting: NURSE PRACTITIONER

## 2018-04-02 LAB
ANION GAP SERPL CALCULATED.3IONS-SCNC: 13 MMOL/L (ref 3–16)
BUN BLDV-MCNC: 16 MG/DL (ref 7–20)
CALCIUM SERPL-MCNC: 9.2 MG/DL (ref 8.3–10.6)
CHLORIDE BLD-SCNC: 97 MMOL/L (ref 99–110)
CO2: 27 MMOL/L (ref 21–32)
CREAT SERPL-MCNC: 0.8 MG/DL (ref 0.9–1.3)
GFR AFRICAN AMERICAN: >60
GFR NON-AFRICAN AMERICAN: >60
GLUCOSE BLD-MCNC: 174 MG/DL (ref 70–99)
POTASSIUM SERPL-SCNC: 4.7 MMOL/L (ref 3.5–5.1)
PRO-BNP: 714 PG/ML (ref 0–124)
SODIUM BLD-SCNC: 137 MMOL/L (ref 136–145)

## 2018-04-03 ENCOUNTER — TELEPHONE (OUTPATIENT)
Dept: CARDIOLOGY CLINIC | Age: 53
End: 2018-04-03

## 2018-04-05 ENCOUNTER — TELEPHONE (OUTPATIENT)
Dept: CARDIOLOGY CLINIC | Age: 53
End: 2018-04-05

## 2018-04-05 RX ORDER — FUROSEMIDE 20 MG/1
20 TABLET ORAL
Qty: 30 TABLET | Refills: 0 | Status: SHIPPED | OUTPATIENT
Start: 2018-04-06 | End: 2018-06-09 | Stop reason: SDUPTHER

## 2018-04-09 ENCOUNTER — TELEPHONE (OUTPATIENT)
Dept: CARDIOLOGY CLINIC | Age: 53
End: 2018-04-09

## 2018-04-09 LAB
ANION GAP SERPL CALCULATED.3IONS-SCNC: 14 MMOL/L (ref 3–16)
BUN BLDV-MCNC: 15 MG/DL (ref 7–20)
CALCIUM SERPL-MCNC: 9.4 MG/DL (ref 8.3–10.6)
CHLORIDE BLD-SCNC: 95 MMOL/L (ref 99–110)
CO2: 28 MMOL/L (ref 21–32)
CREAT SERPL-MCNC: 0.8 MG/DL (ref 0.9–1.3)
GFR AFRICAN AMERICAN: >60
GFR NON-AFRICAN AMERICAN: >60
GLUCOSE BLD-MCNC: 225 MG/DL (ref 70–99)
POTASSIUM SERPL-SCNC: 5.1 MMOL/L (ref 3.5–5.1)
PRO-BNP: 188 PG/ML (ref 0–124)
SODIUM BLD-SCNC: 137 MMOL/L (ref 136–145)

## 2018-04-10 ENCOUNTER — TELEPHONE (OUTPATIENT)
Dept: CARDIOLOGY CLINIC | Age: 53
End: 2018-04-10

## 2018-04-11 ENCOUNTER — TELEPHONE (OUTPATIENT)
Dept: CARDIOLOGY CLINIC | Age: 53
End: 2018-04-11

## 2018-04-16 LAB
ANION GAP SERPL CALCULATED.3IONS-SCNC: 14 MMOL/L (ref 3–16)
BUN BLDV-MCNC: 11 MG/DL (ref 7–20)
CALCIUM SERPL-MCNC: 9.5 MG/DL (ref 8.3–10.6)
CHLORIDE BLD-SCNC: 96 MMOL/L (ref 99–110)
CO2: 28 MMOL/L (ref 21–32)
CREAT SERPL-MCNC: 0.8 MG/DL (ref 0.9–1.3)
GFR AFRICAN AMERICAN: >60
GFR NON-AFRICAN AMERICAN: >60
GLUCOSE BLD-MCNC: 282 MG/DL (ref 70–99)
POTASSIUM SERPL-SCNC: 4.6 MMOL/L (ref 3.5–5.1)
PRO-BNP: 361 PG/ML (ref 0–124)
SODIUM BLD-SCNC: 138 MMOL/L (ref 136–145)

## 2018-04-17 ENCOUNTER — OFFICE VISIT (OUTPATIENT)
Dept: CARDIOLOGY CLINIC | Age: 53
End: 2018-04-17

## 2018-04-17 VITALS
HEIGHT: 72 IN | DIASTOLIC BLOOD PRESSURE: 84 MMHG | SYSTOLIC BLOOD PRESSURE: 136 MMHG | HEART RATE: 95 BPM | OXYGEN SATURATION: 96 % | BODY MASS INDEX: 34.73 KG/M2 | WEIGHT: 256.4 LBS

## 2018-04-17 DIAGNOSIS — I25.10 CORONARY ARTERY DISEASE INVOLVING NATIVE CORONARY ARTERY OF NATIVE HEART WITHOUT ANGINA PECTORIS: ICD-10-CM

## 2018-04-17 DIAGNOSIS — I50.22 SYSTOLIC CHF, CHRONIC (HCC): Primary | ICD-10-CM

## 2018-04-17 DIAGNOSIS — I42.8 NON-ISCHEMIC CARDIOMYOPATHY (HCC): ICD-10-CM

## 2018-04-17 PROCEDURE — 99214 OFFICE O/P EST MOD 30 MIN: CPT | Performed by: INTERNAL MEDICINE

## 2018-05-01 ENCOUNTER — HOSPITAL ENCOUNTER (OUTPATIENT)
Dept: OTHER | Age: 53
Discharge: HOME | End: 2018-05-31
Attending: NURSE PRACTITIONER | Admitting: NURSE PRACTITIONER

## 2018-05-16 PROBLEM — K26.9 DUODENAL ULCER: Status: ACTIVE | Noted: 2018-05-16

## 2018-05-22 ENCOUNTER — HOSPITAL ENCOUNTER (OUTPATIENT)
Dept: CARDIOLOGY | Facility: CLINIC | Age: 53
Discharge: HOME | End: 2018-05-22
Attending: INTERNAL MEDICINE | Admitting: INTERNAL MEDICINE

## 2018-05-22 ENCOUNTER — OFFICE VISIT (OUTPATIENT)
Dept: CARDIOLOGY CLINIC | Age: 53
End: 2018-05-22

## 2018-05-22 VITALS
WEIGHT: 253 LBS | HEIGHT: 73 IN | DIASTOLIC BLOOD PRESSURE: 80 MMHG | HEART RATE: 81 BPM | SYSTOLIC BLOOD PRESSURE: 130 MMHG | BODY MASS INDEX: 33.53 KG/M2 | OXYGEN SATURATION: 98 %

## 2018-05-22 DIAGNOSIS — I42.8 OTHER CARDIOMYOPATHIES (CODE): ICD-10-CM

## 2018-05-22 DIAGNOSIS — R06.02 SHORTNESS OF BREATH: ICD-10-CM

## 2018-05-22 DIAGNOSIS — I50.22 SYSTOLIC CHF, CHRONIC (HCC): Primary | ICD-10-CM

## 2018-05-22 DIAGNOSIS — I42.8 NON-ISCHEMIC CARDIOMYOPATHY (HCC): ICD-10-CM

## 2018-05-22 DIAGNOSIS — I25.10 CORONARY ARTERY DISEASE INVOLVING NATIVE CORONARY ARTERY OF NATIVE HEART WITHOUT ANGINA PECTORIS: ICD-10-CM

## 2018-05-22 LAB
LV EF: 40 %
LVEF MODALITY: NORMAL

## 2018-05-22 PROCEDURE — 99214 OFFICE O/P EST MOD 30 MIN: CPT | Performed by: INTERNAL MEDICINE

## 2018-05-23 ENCOUNTER — TELEPHONE (OUTPATIENT)
Dept: CARDIOLOGY CLINIC | Age: 53
End: 2018-05-23

## 2018-08-21 ENCOUNTER — HOSPITAL ENCOUNTER (OUTPATIENT)
Age: 53
Discharge: HOME | End: 2018-08-21
Payer: COMMERCIAL

## 2018-08-21 ENCOUNTER — OFFICE VISIT (OUTPATIENT)
Dept: CARDIOLOGY CLINIC | Age: 53
End: 2018-08-21

## 2018-08-21 VITALS
BODY MASS INDEX: 33.27 KG/M2 | HEART RATE: 82 BPM | DIASTOLIC BLOOD PRESSURE: 86 MMHG | OXYGEN SATURATION: 98 % | HEIGHT: 73 IN | WEIGHT: 251 LBS | SYSTOLIC BLOOD PRESSURE: 140 MMHG

## 2018-08-21 DIAGNOSIS — R06.02 SHORTNESS OF BREATH: ICD-10-CM

## 2018-08-21 DIAGNOSIS — I25.10 CORONARY ARTERY DISEASE INVOLVING NATIVE CORONARY ARTERY OF NATIVE HEART WITHOUT ANGINA PECTORIS: ICD-10-CM

## 2018-08-21 DIAGNOSIS — I10 ESSENTIAL HYPERTENSION: ICD-10-CM

## 2018-08-21 DIAGNOSIS — I42.8 NON-ISCHEMIC CARDIOMYOPATHY (HCC): ICD-10-CM

## 2018-08-21 DIAGNOSIS — R06.02 SOB (SHORTNESS OF BREATH): ICD-10-CM

## 2018-08-21 DIAGNOSIS — I50.22 SYSTOLIC CHF, CHRONIC (HCC): ICD-10-CM

## 2018-08-21 DIAGNOSIS — I50.22 SYSTOLIC CHF, CHRONIC (HCC): Primary | ICD-10-CM

## 2018-08-21 LAB
A/G RATIO: 1.4 (ref 1.1–2.2)
ALBUMIN SERPL-MCNC: 4.4 G/DL (ref 3.4–5)
ALP BLD-CCNC: 95 U/L (ref 40–129)
ALT SERPL-CCNC: 25 U/L (ref 10–40)
ANION GAP SERPL CALCULATED.3IONS-SCNC: 12 MMOL/L (ref 3–16)
AST SERPL-CCNC: 21 U/L (ref 15–37)
BILIRUB SERPL-MCNC: 0.4 MG/DL (ref 0–1)
BUN BLDV-MCNC: 12 MG/DL (ref 7–20)
CALCIUM SERPL-MCNC: 9.6 MG/DL (ref 8.3–10.6)
CHLORIDE BLD-SCNC: 100 MMOL/L (ref 99–110)
CO2: 28 MMOL/L (ref 21–32)
CREAT SERPL-MCNC: 0.7 MG/DL (ref 0.9–1.3)
GFR AFRICAN AMERICAN: >60
GFR NON-AFRICAN AMERICAN: >60
GLOBULIN: 3.1 G/DL
GLUCOSE BLD-MCNC: 255 MG/DL (ref 70–99)
POTASSIUM SERPL-SCNC: 5.1 MMOL/L (ref 3.5–5.1)
PRO-BNP: 250 PG/ML (ref 0–124)
SODIUM BLD-SCNC: 140 MMOL/L (ref 136–145)
TOTAL PROTEIN: 7.5 G/DL (ref 6.4–8.2)

## 2018-08-21 PROCEDURE — 83880 ASSAY OF NATRIURETIC PEPTIDE: CPT

## 2018-08-21 PROCEDURE — 80053 COMPREHEN METABOLIC PANEL: CPT

## 2018-08-21 PROCEDURE — 99214 OFFICE O/P EST MOD 30 MIN: CPT | Performed by: INTERNAL MEDICINE

## 2018-08-21 PROCEDURE — 36415 COLL VENOUS BLD VENIPUNCTURE: CPT

## 2018-08-21 RX ORDER — IRBESARTAN 300 MG/1
300 TABLET ORAL NIGHTLY
Qty: 90 TABLET | Refills: 3 | Status: SHIPPED | OUTPATIENT
Start: 2018-08-21 | End: 2018-11-27

## 2018-08-21 NOTE — PROGRESS NOTES
[address]  Advanced CHF/Pulmonary Hypertension   Cardiac Evaluation      Yessi Hodge  YOB: 1965    Date of Visit:  8/21/18      Chief Complaint   Patient presents with    3 Month Follow-Up     no cardiac complaints        History of Present Illness:  Yessi Hodge is a 46 y.o. male who presents for hospital follow up and for consultation and management of CM, CAD and HTN referred by Dr. Joya French. Admitted from 2/11/18-2/16/18 with worsening shortness of breath, orthopnea, unable to work due to dyspnea. Echo showed decreased LVEF, cardiac cath as below. He was started on lasix infusion and milrinone with improved diuresis. Admission weight was 255lbs down to 228lbs at discharge. He was started on digoxin, low dose valsartan 20mg due to lower blood pressures, coreg 3.125mg BID. He was also discharged with a life vest due to severe cardiomyopathy. LHC was performed on 2/13/18 by Dr. Joya French. Echo on 2/18/18 shows EF of 10-15%. 3/20/18 he reports he is feeling ok. He reports not taking his Adderall any longer. He continues to work out and reports only lifting 30 lbs. He is watching his diet/sodium more closely. He continues to wear the life vest. He is wanting to go back to work. He is a . He denies chest pain, sob, palpitations, dizziness or syncope. Last visit he reports he is feeling ok. He continues to take his medications as prescribed. He reports his left leg has had tingling and his left foot has a burning sensation. He states he does not check his blood sugar. Last BS drawn on 4/16/18 was 282. He states he is hoping that he can stop wearing the life vest.  He denies chest pain, sob, dizziness or syncope. He continues to work out a [employer] and is admits to taking an OTC (Nugenix) supplement sometimes. Today he is here for follow up CAD, non ischemic cardiomyopathy, and systolic heart failure.  His echocardiogram from 5/22/18 showed the left ventricular systolic function is moderately reduced with an ejection fraction of 40 %. He states he has been feeling well. He has been active going to the gym. He has gradually increased the amount of weight he is lifting. He states he only has to take lasix on a rare occasion. He did not take his blood pressure medication last night. He has not been checking his blood pressure at home. He denies chest pain, SOB, dizziness, or syncope. No Known Allergies  Current Outpatient Prescriptions   Medication Sig Dispense Refill    irbesartan (AVAPRO) 300 MG tablet Take 1 tablet by mouth nightly 90 tablet 3    furosemide (LASIX) 20 MG tablet TAKE 1 TABLET BY MOUTH THREE TIMES A WEEK 30 tablet 0    atorvastatin (LIPITOR) 40 MG tablet Take 1 tablet by mouth nightly 90 tablet 3    digoxin (LANOXIN) 125 MCG tablet Take 1 tablet by mouth daily 90 tablet 3    carvedilol (COREG) 12.5 MG tablet Take 1 tablet by mouth 2 times daily (with meals) 180 tablet 3    valsartan (DIOVAN) 160 MG tablet Take 1 tablet by mouth nightly 90 tablet 3    HYDROcodone-acetaminophen (NORCO) 5-325 MG per tablet Take 1 tablet by mouth daily as needed for Pain .  sitaGLIPtan-metformin (JANUMET)  MG per tablet Take 1 tablet by mouth 2 times daily (with meals)       pantoprazole (PROTONIX) 40 MG tablet Take 1 tablet by mouth every morning (before breakfast) 30 tablet 3    aspirin 81 MG EC tablet Take 1 tablet by mouth daily 30 tablet 0     No current facility-administered medications for this visit. Past Medical History:   Diagnosis Date    Acute CHF ([facility] [address] 2/12/2018    Coronary artery disease involving native coronary artery of native heart without angina pectoris     Diabetes mellitus ([facility] [address]     Hypertension     Systolic heart failure ([facility] [address] 02/2018     Past Surgical History:   Procedure Laterality Date    UPPER GASTROINTESTINAL ENDOSCOPY  05/16/2018     History reviewed. No pertinent family history.   Social History lb (114.8 kg)   05/16/18 252 lb 12.8 oz (114.7 kg)     BP Readings from Last 3 Encounters:   08/21/18 (!) 140/86   05/22/18 130/80   05/17/18 (!) 144/69     Constitutional and General Appearance:   WD/WN in NAD  HEENT:  NC/AT  D[place]  No problems with hearing  Skin:  Warm, dry  Respiratory:  · Normal excursion and expansion without use of accessory muscles  · Resp Auscultation: Normal breath sounds without dullness  Cardiovascular:  · The apical impulses not displaced  · Heart tones are crisp and normal  · Cervical veins are not engorged  · The carotid upstroke is normal in amplitude and contour without delay or bruit  · JVP less than 8 cm H2O  RRR with nl S1 and S2 without m,r,g  · Peripheral pulses are symmetrical and full  · There is no clubbing, cyanosis of the extremities. · No edema  · Femoral Arteries: 2+ and equal  · Pedal Pulses: 2+ and equal   Neck:  · No thyromegaly  Abdomen:  · No masses or tenderness  · Liver/Spleen: No Abnormalities Noted  Neurological/Psychiatric:  · Alert and oriented in all spheres  · Moves all extremities well  · Exhibits normal gait balance and coordination  · No abnormalities of mood, affect, memory, mentation, or behavior are noted    Echocardiogram 5/22/18  The left ventricular systolic function is moderately reduced with an  ejection fraction of 40 %. The left ventricle is mildly dilated. Mild concentric left ventricular hypertrophy. There is hypokinesis of the inferoseptal and inferior walls. Grade I diastolic dysfunction with normal filing pressure. Mild mitral regurgitation. Compared to last echo on 2/12/2018 (EF 10-15 %), left ventricle systolic  function has improved. [facility] 2/13/18  IMPRESSION:  1. Mild CAD of mid RCA, first OM and mid LAD. 2.  Nonischemic dilated cardiomyopathy with EF of 17% on echocardiogram.  3.  Severe pulmonary hypertension. 4.  Moderately elevated left ventricular filling pressure.   5.  Moderately elevated right-sided filling

## 2018-08-21 NOTE — LETTER
 aspirin 81 MG EC tablet Take 1 tablet by mouth daily 30 tablet 0     No current facility-administered medications for this visit. Past Medical History:   Diagnosis Date    Acute CHF ([facility] 2/12/2018    Coronary artery disease involving native coronary artery of native heart without angina pectoris     Diabetes mellitus ([facility]     Hypertension     Systolic heart failure ([facility] 02/2018     Past Surgical History:   Procedure Laterality Date    UPPER GASTROINTESTINAL ENDOSCOPY  05/16/2018     History reviewed. No pertinent family history. Social History     Social History    Marital status:      Spouse name: N/A    Number of children: N/A    Years of education: N/A     Occupational History    Not on file. Social History Main Topics    Smoking status: Never Smoker    Smokeless tobacco: Never Used    Alcohol use Yes      Comment: social    Drug use: No    Sexual activity: Yes     Partners: Male     Other Topics Concern    Not on file     Social History Narrative    No narrative on file       Review of Systems:   · Constitutional: there has been no unanticipated weight loss. There's been no change in energy level, sleep pattern, or activity level. · Eyes: No visual changes or diplopia. No scleral icterus. · ENT: No Headaches, hearing loss or vertigo. No mouth sores or sore throat. · Cardiovascular: Reviewed in HPI  · Respiratory: No cough or wheezing, no sputum production. No hematemesis. · Gastrointestinal: No abdominal pain, appetite loss, blood in stools. No change in bowel or bladder habits. · Genitourinary: No dysuria, trouble voiding, or hematuria. · Musculoskeletal:  No gait disturbance, weakness or joint complaints. · Integumentary: No rash or pruritis. · Neurological: No headache, diplopia, change in muscle strength, numbness or tingling. No change in gait, balance, coordination, mood, affect, memory, mentation, behavior. · Psychiatric: No anxiety, no depression. · Endocrine: No malaise, fatigue or temperature intolerance. No excessive thirst, fluid intake, or urination. No tremor. · Hematologic/Lymphatic: No abnormal bruising or bleeding, blood clots or swollen lymph nodes. · Allergic/Immunologic: No nasal congestion or hives. Physical Examination:    Vitals:    08/21/18 0930 08/21/18 0932   BP: (!) 160/100 (!) 140/86   Pulse: 82    SpO2: 98%    Weight: 251 lb (113.9 kg)    Height: 6' 1\" (1.854 m)      Body mass index is 33.12 kg/m². Wt Readings from Last 3 Encounters:   08/21/18 251 lb (113.9 kg)   05/22/18 253 lb (114.8 kg)   05/16/18 252 lb 12.8 oz (114.7 kg)     BP Readings from Last 3 Encounters:   08/21/18 (!) 140/86   05/22/18 130/80   05/17/18 (!) 144/69     Constitutional and General Appearance:   WD/WN in NAD  HEENT:  NC/AT  [place]  No problems with hearing  Skin:  Warm, dry  Respiratory:  · Normal excursion and expansion without use of accessory muscles  · Resp Auscultation: Normal breath sounds without dullness  Cardiovascular:  · The apical impulses not displaced  · Heart tones are crisp and normal  · Cervical veins are not engorged  · The carotid upstroke is normal in amplitude and contour without delay or bruit  · JVP less than 8 cm H2O  RRR with nl S1 and S2 without m,r,g  · Peripheral pulses are symmetrical and full  · There is no clubbing, cyanosis of the extremities. · No edema  · Femoral Arteries: 2+ and equal  · Pedal Pulses: 2+ and equal   Neck:  · No thyromegaly  Abdomen:  · No masses or tenderness  · Liver/Spleen: No Abnormalities Noted  Neurological/Psychiatric:  · Alert and oriented in all spheres  · Moves all extremities well  · Exhibits normal gait balance and coordination  · No abnormalities of mood, affect, memory, mentation, or behavior are noted    Echocardiogram 5/22/18  The left ventricular systolic function is moderately reduced with an  ejection fraction of 40 %. The left ventricle is mildly dilated. Darwin Pressley M.D. By Glenna Moya RN    The scribe's documentation has been prepared under my direction and personally reviewed by me in its entirety. I confirm that the note above accurately reflects all work, treatment, procedures, and medical decision making performed by me. I appreciate the opportunity of cooperating in the care of this patient. Dakota Ordonez M.D., [facility]        If you have questions, please do not hesitate to call me. I look forward to following Jessie Faustin along with you.     Sincerely,        Colby Wise MD

## 2018-08-22 ENCOUNTER — TELEPHONE (OUTPATIENT)
Dept: CARDIOLOGY CLINIC | Age: 53
End: 2018-08-22

## 2018-08-22 NOTE — TELEPHONE ENCOUNTER
----- Message from Karen Hernandez MD sent at 8/21/2018 10:21 PM EDT -----  Call pt. His labs show that his glucose quite high. Other labs look good. No changes.   MARSHALL

## 2018-08-22 NOTE — TELEPHONE ENCOUNTER
Attempted to contact pt to relay MARSHALL message regarding lab results. No , no machine. Will try to contact at a later time and date.

## 2018-09-17 NOTE — COMMUNICATION BODY
[facility]  Advanced CHF/Pulmonary Hypertension   Cardiac Evaluation      Rachel Jones  YOB: 1965    Date of Visit:  8/21/18      Chief Complaint   Patient presents with    3 Month Follow-Up     no cardiac complaints        History of Present Illness:  Rachel Jones is a 46 y.o. male who presents for hospital follow up and for consultation and management of CM, CAD and HTN referred by Dr. Nasra Pham. Admitted from 2/11/18-2/16/18 with worsening shortness of breath, orthopnea, unable to work due to dyspnea. Echo showed decreased LVEF, cardiac cath as below. He was started on lasix infusion and milrinone with improved diuresis. Admission weight was 255lbs down to 228lbs at discharge. He was started on digoxin, low dose valsartan 20mg due to lower blood pressures, coreg 3.125mg BID. He was also discharged with a life vest due to severe cardiomyopathy. LHC was performed on 2/13/18 by Dr. Nasra Pham. Echo on 2/18/18 shows EF of 10-15%. 3/20/18 he reports he is feeling ok. He reports not taking his Adderall any longer. He continues to work out and reports only lifting 30 lbs. He is watching his diet/sodium more closely. He continues to wear the life vest. He is wanting to go back to work. He is a . He denies chest pain, sob, palpitations, dizziness or syncope. Last visit he reports he is feeling ok. He continues to take his medications as prescribed. He reports his left leg has had tingling and his left foot has a burning sensation. He states he does not check his blood sugar. Last BS drawn on 4/16/18 was 282. He states he is hoping that he can stop wearing the life vest.  He denies chest pain, sob, dizziness or syncope. He continues to work out a [employer] and is admits to taking an OTC (Nugenix) supplement sometimes. Today he is here for follow up CAD, non ischemic cardiomyopathy, and systolic heart failure.  His echocardiogram from 5/22/18 showed the left Social History    Marital status:      Spouse name: N/A    Number of children: N/A    Years of education: N/A     Occupational History    Not on file. Social History Main Topics    Smoking status: Never Smoker    Smokeless tobacco: Never Used    Alcohol use Yes      Comment: social    Drug use: No    Sexual activity: Yes     Partners: Male     Other Topics Concern    Not on file     Social History Narrative    No narrative on file       Review of Systems:   · Constitutional: there has been no unanticipated weight loss. There's been no change in energy level, sleep pattern, or activity level. · Eyes: No visual changes or diplopia. No scleral icterus. · ENT: No Headaches, hearing loss or vertigo. No mouth sores or sore throat. · Cardiovascular: Reviewed in HPI  · Respiratory: No cough or wheezing, no sputum production. No hematemesis. · Gastrointestinal: No abdominal pain, appetite loss, blood in stools. No change in bowel or bladder habits. · Genitourinary: No dysuria, trouble voiding, or hematuria. · Musculoskeletal:  No gait disturbance, weakness or joint complaints. · Integumentary: No rash or pruritis. · Neurological: No headache, diplopia, change in muscle strength, numbness or tingling. No change in gait, balance, coordination, mood, affect, memory, mentation, behavior. · Psychiatric: No anxiety, no depression. · Endocrine: No malaise, fatigue or temperature intolerance. No excessive thirst, fluid intake, or urination. No tremor. · Hematologic/Lymphatic: No abnormal bruising or bleeding, blood clots or swollen lymph nodes. · Allergic/Immunologic: No nasal congestion or hives. Physical Examination:    Vitals:    08/21/18 0930 08/21/18 0932   BP: (!) 160/100 (!) 140/86   Pulse: 82    SpO2: 98%    Weight: 251 lb (113.9 kg)    Height: 6' 1\" (1.854 m)      Body mass index is 33.12 kg/m².      Wt Readings from Last 3 Encounters:   08/21/18 251 lb (113.9 kg)   05/22/18 253

## 2018-11-27 ENCOUNTER — OFFICE VISIT (OUTPATIENT)
Dept: CARDIOLOGY CLINIC | Age: 53
End: 2018-11-27
Payer: COMMERCIAL

## 2018-11-27 VITALS
SYSTOLIC BLOOD PRESSURE: 140 MMHG | BODY MASS INDEX: 33.69 KG/M2 | DIASTOLIC BLOOD PRESSURE: 88 MMHG | HEIGHT: 73 IN | WEIGHT: 254.2 LBS | OXYGEN SATURATION: 98 % | HEART RATE: 103 BPM

## 2018-11-27 DIAGNOSIS — I50.22 SYSTOLIC CHF, CHRONIC (HCC): Primary | ICD-10-CM

## 2018-11-27 DIAGNOSIS — I42.8 NON-ISCHEMIC CARDIOMYOPATHY (HCC): ICD-10-CM

## 2018-11-27 DIAGNOSIS — R06.02 SHORTNESS OF BREATH: ICD-10-CM

## 2018-11-27 DIAGNOSIS — I10 ESSENTIAL HYPERTENSION: ICD-10-CM

## 2018-11-27 DIAGNOSIS — E78.2 MIXED HYPERLIPIDEMIA: ICD-10-CM

## 2018-11-27 DIAGNOSIS — I25.10 CORONARY ARTERY DISEASE INVOLVING NATIVE CORONARY ARTERY OF NATIVE HEART WITHOUT ANGINA PECTORIS: ICD-10-CM

## 2018-11-27 PROCEDURE — 93000 ELECTROCARDIOGRAM COMPLETE: CPT | Performed by: INTERNAL MEDICINE

## 2018-11-27 PROCEDURE — 99214 OFFICE O/P EST MOD 30 MIN: CPT | Performed by: INTERNAL MEDICINE

## 2018-11-27 RX ORDER — VALSARTAN 320 MG/1
320 TABLET ORAL NIGHTLY
Qty: 90 TABLET | Refills: 3 | Status: SHIPPED | OUTPATIENT
Start: 2018-11-27 | End: 2019-09-24 | Stop reason: SDUPTHER

## 2018-11-27 RX ORDER — DIGOXIN 125 MCG
125 TABLET ORAL DAILY
Qty: 90 TABLET | Refills: 3 | Status: SHIPPED | OUTPATIENT
Start: 2018-11-27 | End: 2019-02-26 | Stop reason: SDUPTHER

## 2018-11-27 NOTE — PATIENT INSTRUCTIONS
Plan:  1. Check blood pressure at home   2. Restart digoxin  3. Increase Valsartan to 320 mg nightly  4. Lipids, CBC, CMP, and BNP  5.  Follow up in 3 months

## 2018-11-27 NOTE — PROGRESS NOTES
cough or wheezing, no sputum production. No hematemesis. · Gastrointestinal: No abdominal pain, appetite loss, blood in stools. No change in bowel or bladder habits. · Genitourinary: No dysuria, trouble voiding, or hematuria. · Musculoskeletal:  No gait disturbance, weakness or joint complaints. · Integumentary: No rash or pruritis. · Neurological: No headache, diplopia, change in muscle strength, numbness or tingling. No change in gait, balance, coordination, mood, affect, memory, mentation, behavior. · Psychiatric: No anxiety, no depression. · Endocrine: No malaise, fatigue or temperature intolerance. No excessive thirst, fluid intake, or urination. No tremor. · Hematologic/Lymphatic: No abnormal bruising or bleeding, blood clots or swollen lymph nodes. · Allergic/Immunologic: No nasal congestion or hives. Physical Examination:    Vitals:    11/27/18 0940 11/27/18 1004   BP: (!) 140/88 (!) 140/88   Pulse: 103    SpO2: 98%    Weight: 254 lb 3.2 oz (115.3 kg)    Height: 6' 1\" (1.854 m)      Body mass index is 33.54 kg/m². Wt Readings from Last 3 Encounters:   11/27/18 254 lb 3.2 oz (115.3 kg)   08/21/18 251 lb (113.9 kg)   05/22/18 253 lb (114.8 kg)     BP Readings from Last 3 Encounters:   11/27/18 (!) 140/88   08/21/18 (!) 140/86   05/22/18 130/80     Constitutional and General Appearance:   WD/WN in NAD  HEENT:  NC/AT  D[place]  No problems with hearing  Skin:  Warm, dry  Respiratory:  · Normal excursion and expansion without use of accessory muscles  · Resp Auscultation: Normal breath sounds without dullness  Cardiovascular:  · The apical impulses not displaced  · Heart tones are crisp and normal  · Cervical veins are not engorged  · The carotid upstroke is normal in amplitude and contour without delay or bruit  · JVP less than 8 cm H2O  RRR with nl S1 and S2 without m,r,g  · Peripheral pulses are symmetrical and full  · There is no clubbing, cyanosis of the extremities.   · No edema  · Femoral

## 2018-11-27 NOTE — LETTER
Cardiovascular:  · The apical impulses not displaced  · Heart tones are crisp and normal  · Cervical veins are not engorged  · The carotid upstroke is normal in amplitude and contour without delay or bruit  · JVP less than 8 cm H2O  RRR with nl S1 and S2 without m,r,g  · Peripheral pulses are symmetrical and full  · There is no clubbing, cyanosis of the extremities. · No edema  · Femoral Arteries: 2+ and equal  · Pedal Pulses: 2+ and equal   Neck:  · No thyromegaly  Abdomen:  · No masses or tenderness  · Liver/Spleen: No Abnormalities Noted  Neurological/Psychiatric:  · Alert and oriented in all spheres  · Moves all extremities well  · Exhibits normal gait balance and coordination  · No abnormalities of mood, affect, memory, mentation, or behavior are noted    Echocardiogram 5/22/18  The left ventricular systolic function is moderately reduced with an  ejection fraction of 40 %. The left ventricle is mildly dilated. Mild concentric left ventricular hypertrophy. There is hypokinesis of the inferoseptal and inferior walls. Grade I diastolic dysfunction with normal filing pressure. Mild mitral regurgitation. Compared to last echo on 2/12/2018 (EF 10-15 %), left ventricle systolic  function has improved. [facility] 2/13/18  IMPRESSION:  1. Mild CAD of mid RCA, first OM and mid LAD. 2.  Nonischemic dilated cardiomyopathy with EF of 17% on echocardiogram.  3.  Severe pulmonary hypertension. 4.  Moderately elevated left ventricular filling pressure. 5.  Moderately elevated right-sided filling pressures. 6.  Severely reduced cardiac index. 7.  PVR and PCWP suggest pulmonary hypertension is of mixed etiology of   pulmonary disease as well as left heart failure. ECHO 2/12/18  There is no echo for comparison. The left ventricular systolic function is severely reduced with an  ejection fraction of 17 % by Saba's. Visually LVEF appears to be 10-15%.

## 2018-12-02 NOTE — COMMUNICATION BODY
[address]  Advanced CHF/Pulmonary Hypertension   Cardiac Evaluation      Gaby Murray  YOB: 1965    Date of Visit:  11/27/18      Chief Complaint   Patient presents with    Congestive Heart Failure        History of Present Illness:  Gaby Murray is a 46 y.o. male who presents for hospital follow up and for consultation and management of CM, CAD and HTN referred by Dr. Rachelle Haley. Admitted from 2/11/18-2/16/18 with worsening shortness of breath, orthopnea, unable to work due to dyspnea. Echo showed decreased LVEF, cardiac cath as below. He was started on lasix infusion and milrinone with improved diuresis. Admission weight was 255lbs down to 228lbs at discharge. He was started on digoxin, low dose valsartan 20mg due to lower blood pressures, coreg 3.125mg BID. He was also discharged with a life vest due to severe cardiomyopathy. LHC was performed on 2/13/18 by Dr. Rachelle aHley. Echo on 2/18/18 shows EF of 10-15%. His echocardiogram from 5/22/18 showed the left ventricular systolic function is moderately reduced with an ejection fraction of 40 %. Today his HR is elevated on exam. He has been taking his coreg but stopped the digoxin 3 months ago. He states he was taking a detox supplement that interacts with digoxin so he stopped the digoxin. His BP is elevated on exam as well. He takes Valsartan one nightly. He has not needed the lasix. He stopped taking lipitor as well. He reports his energy level is ok. His legs get weak when he climbs steps. He denies CP, SOB, dizziness or syncope.      No Known Allergies  Current Outpatient Prescriptions   Medication Sig Dispense Refill    furosemide (LASIX) 20 MG tablet TAKE 1 TABLET BY MOUTH THREE TIMES A WEEK (Patient taking differently: Take 20 mg by mouth Patient is taking only when he feels he needs it) 30 tablet 0    carvedilol (COREG) 12.5 MG tablet Take 1 tablet by mouth 2 times daily (with meals) 180 tablet 3    valsartan Arteries: 2+ and equal  · Pedal Pulses: 2+ and equal   Neck:  · No thyromegaly  Abdomen:  · No masses or tenderness  · Liver/Spleen: No Abnormalities Noted  Neurological/Psychiatric:  · Alert and oriented in all spheres  · Moves all extremities well  · Exhibits normal gait balance and coordination  · No abnormalities of mood, affect, memory, mentation, or behavior are noted    Echocardiogram 5/22/18  The left ventricular systolic function is moderately reduced with an  ejection fraction of 40 %. The left ventricle is mildly dilated. Mild concentric left ventricular hypertrophy. There is hypokinesis of the inferoseptal and inferior walls. Grade I diastolic dysfunction with normal filing pressure. Mild mitral regurgitation. Compared to last echo on 2/12/2018 (EF 10-15 %), left ventricle systolic  function has improved. [facility] 2/13/18  IMPRESSION:  1. Mild CAD of mid RCA, first OM and mid LAD. 2.  Nonischemic dilated cardiomyopathy with EF of 17% on echocardiogram.  3.  Severe pulmonary hypertension. 4.  Moderately elevated left ventricular filling pressure. 5.  Moderately elevated right-sided filling pressures. 6.  Severely reduced cardiac index. 7.  PVR and PCWP suggest pulmonary hypertension is of mixed etiology of   pulmonary disease as well as left heart failure. ECHO 2/12/18  There is no echo for comparison. The left ventricular systolic function is severely reduced with an  ejection fraction of 17 % by Saba's. Visually LVEF appears to be 10-15%. There is global hypokinesis with regional variations. There is mild  concentric left ventricular hypertrophy. Left ventricular size is moderately  dilated. Elevated left ventricular diastolic filling pressure. Moderate mitral regurgitation. No stenosis. Severely dilated left and right atria. The right ventricle is moderately enlarged. Right ventricular systolic  function is mildly reduced. TAPSE = 15 mm. Assessment:    1.  Systolic CHF,

## 2019-01-11 LAB
AVERAGE GLUCOSE: NORMAL
HBA1C MFR BLD: 8.5 %

## 2019-02-26 ENCOUNTER — OFFICE VISIT (OUTPATIENT)
Dept: CARDIOLOGY CLINIC | Age: 54
End: 2019-02-26
Payer: COMMERCIAL

## 2019-02-26 VITALS
SYSTOLIC BLOOD PRESSURE: 150 MMHG | BODY MASS INDEX: 34.33 KG/M2 | WEIGHT: 259 LBS | HEART RATE: 86 BPM | OXYGEN SATURATION: 98 % | DIASTOLIC BLOOD PRESSURE: 88 MMHG | HEIGHT: 73 IN

## 2019-02-26 DIAGNOSIS — I50.22 SYSTOLIC CHF, CHRONIC (HCC): Primary | ICD-10-CM

## 2019-02-26 DIAGNOSIS — I42.8 NON-ISCHEMIC CARDIOMYOPATHY (HCC): ICD-10-CM

## 2019-02-26 DIAGNOSIS — I10 ESSENTIAL HYPERTENSION: ICD-10-CM

## 2019-02-26 DIAGNOSIS — I25.10 CORONARY ARTERY DISEASE INVOLVING NATIVE CORONARY ARTERY OF NATIVE HEART WITHOUT ANGINA PECTORIS: ICD-10-CM

## 2019-02-26 PROCEDURE — 99214 OFFICE O/P EST MOD 30 MIN: CPT | Performed by: INTERNAL MEDICINE

## 2019-02-26 RX ORDER — DIGOXIN 125 MCG
125 TABLET ORAL DAILY
Qty: 90 TABLET | Refills: 3 | Status: SHIPPED | OUTPATIENT
Start: 2019-02-26 | End: 2020-03-10

## 2019-02-26 RX ORDER — CARVEDILOL 12.5 MG/1
12.5 TABLET ORAL 2 TIMES DAILY WITH MEALS
Qty: 180 TABLET | Refills: 3 | Status: SHIPPED | OUTPATIENT
Start: 2019-02-26 | End: 2020-03-10

## 2019-02-26 RX ORDER — ATORVASTATIN CALCIUM 40 MG/1
40 TABLET, FILM COATED ORAL NIGHTLY
Qty: 90 TABLET | Refills: 3 | Status: SHIPPED | OUTPATIENT
Start: 2019-02-26 | End: 2020-03-10

## 2019-06-04 ENCOUNTER — HOSPITAL ENCOUNTER (OUTPATIENT)
Dept: CARDIOLOGY | Age: 54
Discharge: HOME | End: 2019-06-04
Payer: COMMERCIAL

## 2019-06-04 ENCOUNTER — OFFICE VISIT (OUTPATIENT)
Dept: CARDIOLOGY CLINIC | Age: 54
End: 2019-06-04
Payer: COMMERCIAL

## 2019-06-04 VITALS
SYSTOLIC BLOOD PRESSURE: 128 MMHG | WEIGHT: 253.4 LBS | BODY MASS INDEX: 33.58 KG/M2 | OXYGEN SATURATION: 98 % | HEIGHT: 73 IN | HEART RATE: 99 BPM | DIASTOLIC BLOOD PRESSURE: 72 MMHG

## 2019-06-04 DIAGNOSIS — I50.22 SYSTOLIC CHF, CHRONIC (HCC): ICD-10-CM

## 2019-06-04 DIAGNOSIS — I50.22 SYSTOLIC CHF, CHRONIC (HCC): Primary | ICD-10-CM

## 2019-06-04 DIAGNOSIS — I42.8 NON-ISCHEMIC CARDIOMYOPATHY (HCC): ICD-10-CM

## 2019-06-04 DIAGNOSIS — I25.10 CORONARY ARTERY DISEASE INVOLVING NATIVE CORONARY ARTERY OF NATIVE HEART WITHOUT ANGINA PECTORIS: ICD-10-CM

## 2019-06-04 DIAGNOSIS — R06.02 SHORTNESS OF BREATH: ICD-10-CM

## 2019-06-04 LAB
LV EF: 40 %
LVEF MODALITY: NORMAL

## 2019-06-04 PROCEDURE — 93306 TTE W/DOPPLER COMPLETE: CPT

## 2019-06-04 PROCEDURE — 99214 OFFICE O/P EST MOD 30 MIN: CPT | Performed by: INTERNAL MEDICINE

## 2019-06-04 RX ORDER — DEXTROAMPHETAMINE SACCHARATE, AMPHETAMINE ASPARTATE, DEXTROAMPHETAMINE SULFATE AND AMPHETAMINE SULFATE 5; 5; 5; 5 MG/1; MG/1; MG/1; MG/1
20 TABLET ORAL 2 TIMES DAILY
COMMUNITY

## 2019-06-04 NOTE — PATIENT INSTRUCTIONS
Plan:  1. Recommend valsartan 160 mg (1/2 tab) twice daily or 320 mg at night  2.  Follow up in 6 months

## 2019-06-04 NOTE — LETTER
 carvedilol (COREG) 12.5 MG tablet Take 1 tablet by mouth 2 times daily (with meals) 180 tablet 3    digoxin (LANOXIN) 125 MCG tablet Take 1 tablet by mouth daily 90 tablet 3    valsartan (DIOVAN) 320 MG tablet Take 1 tablet by mouth nightly (Patient taking differently: Take 160 mg by mouth nightly ) 90 tablet 3    furosemide (LASIX) 20 MG tablet TAKE 1 TABLET BY MOUTH THREE TIMES A WEEK (Patient taking differently: Take 20 mg by mouth Patient is taking only when he feels he needs it) 30 tablet 0    aspirin 81 MG EC tablet Take 1 tablet by mouth daily 30 tablet 0    HYDROcodone-acetaminophen (NORCO) 5-325 MG per tablet Take 1 tablet by mouth daily as needed for Pain .  sitaGLIPtan-metformin (JANUMET)  MG per tablet Take 1 tablet by mouth 2 times daily (with meals)        No current facility-administered medications for this visit. Past Medical History:   Diagnosis Date    Acute CHF ([facility] 2/12/2018    Coronary artery disease involving native coronary artery of native heart without angina pectoris     Diabetes mellitus ([facility]     Hypertension     Systolic heart failure ([facility] 02/2018     Past Surgical History:   Procedure Laterality Date    UPPER GASTROINTESTINAL ENDOSCOPY  05/16/2018     History reviewed. No pertinent family history.   Social History     Socioeconomic History    Marital status:      Spouse name: Not on file    Number of children: Not on file    Years of education: Not on file    Highest education level: Not on file   Occupational History    Not on file   Social Needs    Financial resource strain: Not on file    Food insecurity:     Worry: Not on file     Inability: Not on file    Transportation needs:     Medical: Not on file     Non-medical: Not on file   Tobacco Use    Smoking status: Never Smoker    Smokeless tobacco: Never Used   Substance and Sexual Activity    Alcohol use: Yes     Comment: social    Drug use: No    Sexual activity: Yes Partners: Male   Lifestyle    Physical activity:     Days per week: Not on file     Minutes per session: Not on file    Stress: Not on file   Relationships    Social connections:     Talks on phone: Not on file     Gets together: Not on file     Attends Cheondoism service: Not on file     Active member of club or organization: Not on file     Attends meetings of clubs or organizations: Not on file     Relationship status: Not on file    Intimate partner violence:     Fear of current or ex partner: Not on file     Emotionally abused: Not on file     Physically abused: Not on file     Forced sexual activity: Not on file   Other Topics Concern    Not on file   Social History Narrative    Not on file       Review of Systems:   · Constitutional: there has been no unanticipated weight loss. There's been no change in energy level, sleep pattern, or activity level. · Eyes: No visual changes or diplopia. No scleral icterus. · ENT: No Headaches, hearing loss or vertigo. No mouth sores or sore throat. · Cardiovascular: Reviewed in HPI  · Respiratory: No cough or wheezing, no sputum production. No hematemesis. · Gastrointestinal: No abdominal pain, appetite loss, blood in stools. No change in bowel or bladder habits. · Genitourinary: No dysuria, trouble voiding, or hematuria. · Musculoskeletal:  No gait disturbance, weakness or joint complaints. · Integumentary: No rash or pruritis. · Neurological: No headache, diplopia, change in muscle strength, numbness or tingling. No change in gait, balance, coordination, mood, affect, memory, mentation, behavior. · Psychiatric: No anxiety, no depression. · Endocrine: No malaise, fatigue or temperature intolerance. No excessive thirst, fluid intake, or urination. No tremor. · Hematologic/Lymphatic: No abnormal bruising or bleeding, blood clots or swollen lymph nodes. · Allergic/Immunologic: No nasal congestion or hives.     Physical Examination:    Vitals: 1.  Mild CAD of mid RCA, first OM and mid LAD. 2.  Nonischemic dilated cardiomyopathy with EF of 17% on echocardiogram.  3.  Severe pulmonary hypertension. 4.  Moderately elevated left ventricular filling pressure. 5.  Moderately elevated right-sided filling pressures. 6.  Severely reduced cardiac index. 7.  PVR and PCWP suggest pulmonary hypertension is of mixed etiology of   pulmonary disease as well as left heart failure. ECHO 2/12/18  There is no echo for comparison. The left ventricular systolic function is severely reduced with an  ejection fraction of 17 % by Saba's. Visually LVEF appears to be 10-15%. There is global hypokinesis with regional variations. There is mild  concentric left ventricular hypertrophy. Left ventricular size is moderately  dilated. Elevated left ventricular diastolic filling pressure. Moderate mitral regurgitation. No stenosis. Severely dilated left and right atria. The right ventricle is moderately enlarged. Right ventricular systolic  function is mildly reduced. TAPSE = 15 mm. Labs: 04/15/19: Na 141, K 4.3, BUN 18, Creat 0.81, H/H 13.8/39.2, TC 92, LDL 49, HDL 29, TG 72.    Labs were reviewed including labs from other hospital systems through [facility]. Cardiac testing was reviewed including echos, nuclear scans, cardiac catheterization, including from other hospital systems through [facility]. Assessment:    1. Systolic CHF, chronic ([address] :  His blood pressure continues to run high for his LV function. I am encouraging him to increase the valsartan back to 320 mg since lvef is still 40%/  The Adderall is mt helping to improve his LV function. If EF starts to go down, may need o stop Adderall. 2. Non-ischemic cardiomyopathy ([address]    3. Coronary artery disease involving native coronary artery of native heart without angina pectoris    4. Shortness of breath       He is not being compliant with his meds.  I remain concerned about other

## 2019-06-04 NOTE — PROGRESS NOTES
[facility]  Advanced CHF/Pulmonary Hypertension   Cardiac Evaluation      Pietro Griffiths  YOB: 1965    Date of Visit:  6/4/19      Chief Complaint   Patient presents with    Follow-up        History of Present Illness:  Pietro Griffiths is a 53 y.o. male who presents for hospital follow up and for consultation and management of CM, CAD and HTN referred by Dr. Miriam Luna. Admitted from 2/11/18-2/16/18 with worsening shortness of breath, orthopnea, unable to work due to dyspnea. Echo showed decreased LVEF, cardiac cath as below. He was started on lasix infusion and milrinone with improved diuresis. Admission weight was 255lbs down to 228lbs at discharge. He was started on digoxin, low dose valsartan 20mg due to lower blood pressures, coreg 3.125mg BID. He was also discharged with a life vest due to severe cardiomyopathy. LHC was performed on 2/13/18 by Dr. Miriam Luna. Echo on 2/18/18 shows EF of 10-15%. His echocardiogram from 5/22/18 showed the left ventricular systolic function is moderately reduced with an ejection fraction of 40 %. On 11/27/18 he reported he stopped the digoxin 3 months ago (08/2018). He states he was taking a detox supplement that interacts with digoxin so he stopped the digoxin. Digoxin was restarted. Valsartan was increased to 320 mg daily. Today he reports he decreased his valsartan to 160 mg. He states the medication made him \"feel weird. \" He reports he stopped using sodium and stopped drinking energy drinks. His echo from today showed his EF was 40%. He denies CP, SOB, palpitations, dizziness or syncope. He states he is taking digoxin and coreg. However, I discovered that he has been on Adderall bid by his PCP. He did not tell us that he has been on stimulants.       No Known Allergies  Current Outpatient Medications   Medication Sig Dispense Refill    atorvastatin (LIPITOR) 40 MG tablet Take 1 tablet by mouth nightly 90 tablet 3    carvedilol (COREG) 12.5 MG tablet Take 1 tablet by mouth 2 times daily (with meals) 180 tablet 3    digoxin (LANOXIN) 125 MCG tablet Take 1 tablet by mouth daily 90 tablet 3    valsartan (DIOVAN) 320 MG tablet Take 1 tablet by mouth nightly (Patient taking differently: Take 160 mg by mouth nightly ) 90 tablet 3    furosemide (LASIX) 20 MG tablet TAKE 1 TABLET BY MOUTH THREE TIMES A WEEK (Patient taking differently: Take 20 mg by mouth Patient is taking only when he feels he needs it) 30 tablet 0    aspirin 81 MG EC tablet Take 1 tablet by mouth daily 30 tablet 0    HYDROcodone-acetaminophen (NORCO) 5-325 MG per tablet Take 1 tablet by mouth daily as needed for Pain .  sitaGLIPtan-metformin (JANUMET)  MG per tablet Take 1 tablet by mouth 2 times daily (with meals)        No current facility-administered medications for this visit. Past Medical History:   Diagnosis Date    [facility] 2/12/2018    Coronary artery disease involving native coronary artery of native heart without angina pectoris     [facility]     Hypertension     Systolic heart failure ([facility] 02/2018     Past Surgical History:   Procedure Laterality Date    UPPER GASTROINTESTINAL ENDOSCOPY  05/16/2018     History reviewed. No pertinent family history.   Social History     Socioeconomic History    Marital status:      Spouse name: Not on file    Number of children: Not on file    Years of education: Not on file    Highest education level: Not on file   Occupational History    Not on file   Social Needs    Financial resource strain: Not on file    Food insecurity:     Worry: Not on file     Inability: Not on file    Transportation needs:     Medical: Not on file     Non-medical: Not on file   Tobacco Use    Smoking status: Never Smoker    Smokeless tobacco: Never Used   Substance and Sexual Activity    Alcohol use: Yes     Comment: social    Drug use: No    Sexual activity: Yes     Partners: Male Lifestyle    Physical activity:     Days per week: Not on file     Minutes per session: Not on file    Stress: Not on file   Relationships    Social connections:     Talks on phone: Not on file     Gets together: Not on file     Attends Orthodox service: Not on file     Active member of club or organization: Not on file     Attends meetings of clubs or organizations: Not on file     Relationship status: Not on file    Intimate partner violence:     Fear of current or ex partner: Not on file     Emotionally abused: Not on file     Physically abused: Not on file     Forced sexual activity: Not on file   Other Topics Concern    Not on file   Social History Narrative    Not on file       Review of Systems:   · Constitutional: there has been no unanticipated weight loss. There's been no change in energy level, sleep pattern, or activity level. · Eyes: No visual changes or diplopia. No scleral icterus. · ENT: No Headaches, hearing loss or vertigo. No mouth sores or sore throat. · Cardiovascular: Reviewed in HPI  · Respiratory: No cough or wheezing, no sputum production. No hematemesis. · Gastrointestinal: No abdominal pain, appetite loss, blood in stools. No change in bowel or bladder habits. · Genitourinary: No dysuria, trouble voiding, or hematuria. · Musculoskeletal:  No gait disturbance, weakness or joint complaints. · Integumentary: No rash or pruritis. · Neurological: No headache, diplopia, change in muscle strength, numbness or tingling. No change in gait, balance, coordination, mood, affect, memory, mentation, behavior. · Psychiatric: No anxiety, no depression. · Endocrine: No malaise, fatigue or temperature intolerance. No excessive thirst, fluid intake, or urination. No tremor. · Hematologic/Lymphatic: No abnormal bruising or bleeding, blood clots or swollen lymph nodes. · Allergic/Immunologic: No nasal congestion or hives.     Physical Examination:    Vitals:    06/04/19 0835 06/04/19 LAD.  2.  Nonischemic dilated cardiomyopathy with EF of 17% on echocardiogram.  3.  Severe pulmonary hypertension. 4.  Moderately elevated left ventricular filling pressure. 5.  Moderately elevated right-sided filling pressures. 6.  Severely reduced cardiac index. 7.  PVR and PCWP suggest pulmonary hypertension is of mixed etiology of   pulmonary disease as well as left heart failure. ECHO 2/12/18  There is no echo for comparison. The left ventricular systolic function is severely reduced with an  ejection fraction of 17 % by Saba's. Visually LVEF appears to be 10-15%. There is global hypokinesis with regional variations. There is mild  concentric left ventricular hypertrophy. Left ventricular size is moderately  dilated. Elevated left ventricular diastolic filling pressure. Moderate mitral regurgitation. No stenosis. Severely dilated left and right atria. The right ventricle is moderately enlarged. Right ventricular systolic  function is mildly reduced. TAPSE = 15 mm. Labs: 04/15/19: Na 141, K 4.3, BUN 18, Creat 0.81, H/H 13.8/39.2, TC 92, LDL 49, HDL 29, TG 72.    Labs were reviewed including labs from other hospital systems through [facility]. Cardiac testing was reviewed including echos, nuclear scans, cardiac catheterization, including from other hospital systems through [facility]. Assessment:    1. Systolic CHF, chronic ([address] :  His blood pressure continues to run high for his LV function. I am encouraging him to increase the valsartan back to 320 mg since lvef is still 40%/  The Adderall is mt helping to improve his LV function. If EF starts to go down, may need o stop Adderall. 2. Non-ischemic cardiomyopathy ([address]    3. Coronary artery disease involving native coronary artery of native heart without angina pectoris    4. Shortness of breath       He is not being compliant with his meds.  I remain concerned about other meds that he might be taking, such as male hormones, etc.  I discussed with him that I am concerned about him taking the Adderall. Plan:  1. Recommend valsartan 160 mg (1/2 tab) twice daily or 320 mg at night  Get labs before next OV  2. Follow up in 6 months        Scribe's attestation: This note was scribed in the presence of Jeffrey Ng M.D. By Bob Borges RN     The scribe's documentation has been prepared under my direction and personally reviewed by me in its entirety. I confirm that the note above accurately reflects all work, treatment, procedures, and medical decision making performed by me. I appreciate the opportunity of cooperating in the care of this patient.     Jeffrey Ng M.D., Laura Sinclair

## 2019-09-24 ENCOUNTER — TELEPHONE (OUTPATIENT)
Dept: CARDIOLOGY CLINIC | Age: 54
End: 2019-09-24

## 2019-09-24 DIAGNOSIS — I50.22 SYSTOLIC CHF, CHRONIC (HCC): ICD-10-CM

## 2019-09-24 DIAGNOSIS — I42.8 NON-ISCHEMIC CARDIOMYOPATHY (HCC): ICD-10-CM

## 2019-09-24 DIAGNOSIS — I10 ESSENTIAL HYPERTENSION: ICD-10-CM

## 2019-09-24 RX ORDER — VALSARTAN 320 MG/1
160 TABLET ORAL NIGHTLY
Qty: 90 TABLET | Refills: 3 | Status: SHIPPED | OUTPATIENT
Start: 2019-09-24

## 2019-10-16 PROBLEM — G89.29 CHRONIC PAIN OF BOTH SHOULDERS: Status: ACTIVE | Noted: 2019-10-16

## 2019-10-16 PROBLEM — M25.511 CHRONIC PAIN OF BOTH SHOULDERS: Status: ACTIVE | Noted: 2019-10-16

## 2019-10-16 PROBLEM — M25.512 CHRONIC PAIN OF BOTH SHOULDERS: Status: ACTIVE | Noted: 2019-10-16

## 2019-10-16 PROBLEM — M51.36 DDD (DEGENERATIVE DISC DISEASE), LUMBAR: Status: ACTIVE | Noted: 2019-10-16

## 2020-03-10 RX ORDER — CARVEDILOL 12.5 MG/1
TABLET ORAL
Qty: 180 TABLET | Refills: 0 | Status: SHIPPED | OUTPATIENT
Start: 2020-03-10

## 2020-03-10 RX ORDER — ATORVASTATIN CALCIUM 40 MG/1
TABLET, FILM COATED ORAL
Qty: 90 TABLET | Refills: 0 | Status: SHIPPED | OUTPATIENT
Start: 2020-03-10

## 2020-03-10 RX ORDER — DIGOXIN 125 MCG
125 TABLET ORAL DAILY
Qty: 90 TABLET | Refills: 0 | Status: SHIPPED | OUTPATIENT
Start: 2020-03-10

## 2020-04-15 PROBLEM — M51.36 LUMBAR DEGENERATIVE DISC DISEASE: Status: ACTIVE | Noted: 2020-04-15

## 2021-02-17 PROBLEM — M51.36 DEGENERATION OF LUMBAR INTERVERTEBRAL DISC: Status: ACTIVE | Noted: 2021-02-17

## 2023-11-13 NOTE — PROGRESS NOTES
Breezy Kody    Age 60 y.o.    male    1965    MRN [id number]    11/17/2023  Arrival Time_____________  OR Time____________[address]     Procedure(s):  PARTIAL AMPUTATION RIGHT HALLUX                      Monitor Anesthesia Care    Surgeon(s):  Shaka Campbell, [place]       Phone [phone] (home)     [facility]  [facility]         Work  _____________________________________________________________________  _____________________________________________________________________  _____________________________________________________________________  _____________________________________________________________________  _____________________________________________________________________    PCP _____________________________ Phone_________________     H&P  ________________  Bringing      Chart              Epic      DOS      Called________  EKG ________________   Bringing      Chart              Epic      DOS      Called________  LABS________________   Bringing     Chart              Epic      DOS      Called________  Cardiac Clearance ______ Bringing      Chart              Epic      DOS      Called________  Pulmonary Clearance____ Bringing      Chart              Epic      DOS      Called________    Cardiologist________________________ Phone___________________________  Pulmonologist_______________________Phone___________________________    ? Advance Directives   ? Denominational concerns / Waiver on Chart            PAT Communications________________  ? Pre-op Instructions Given [address]          _________________________________  ? Directions to Surgery Center                          _________________________________  ? Transportation Home_______________      __________________________________  ?  Crutches/Walker__________________        __________________________________    ________Pre-op Orders   _______Transcribed    _______Wt.  ________Pharmacy          _______SCD       ______CHRISTINA Rapp

## 2023-11-13 NOTE — PROGRESS NOTES
Date and time of surgery :   11/17/23 at 1040           Arrival Time:  840     Bring Picture ID and insurance card. Please wear simple, loose fitting clothing to the hospital.   Roberto Lowry not bring valuables (money, credit cards, checkbooks, etc.)   DO NOT wear any jewelry or piercings on day of surgery. All body piercing jewelry must be removed. If you have dentures, they will be removed before going to the OR; we will provide you a container. If you wear contact lenses or glasses, they will be removed; please bring a case for them. Shower the evening before or morning of surgery with antibacterial soap. Nothing to eat or drink after midnight the day before surgery. You may brush your teeth and gargle the morning of surgery. DO NOT SWALLOW WATER. The morning of your surgery take only Coreg and Digoxin with a sip of water. Aspirin, Ibuprofen, Advil, Naproxen, Vitamin E and other Anti-inflammatory products and supplements should be stopped for 5 -7days before surgery or as directed by your physician. Do not smoke or drink any alcoholic beverages 24 hours prior to surgery. This includes NA Beer. Refrain from the usage of any recreational drugs, including non-prescribed prescription drugs. You MUST plan for a responsible adult to stay on site while you are here and take you home after your surgery. You will not be allowed to leave alone or drive yourself home. It is strongly suggested someone stay with you the first 24 hrs. Your surgery will be cancelled if you do not have a ride home. To help prevent infection, change your sheets the night before surgery. If you  have a Living Will and Durable Power of  for Healthcare, please bring in a copy. Notify your Surgeon if you develop any illness between now and time of surgery.  Cough, cold, fever, sore throat, nausea, vomiting, etc.  Please notify your surgeon if you experience dizziness, shortness of breath or blurred vision between now & the time of

## 2023-11-16 ENCOUNTER — ANESTHESIA EVENT (OUTPATIENT)
Dept: OPERATING ROOM | Age: 58
End: 2023-11-16
Payer: COMMERCIAL

## 2023-11-17 ENCOUNTER — HOSPITAL ENCOUNTER (OUTPATIENT)
Age: 58
Setting detail: OUTPATIENT SURGERY
Discharge: HOME | End: 2023-11-17
Attending: PODIATRIST | Admitting: PODIATRIST
Payer: COMMERCIAL

## 2023-11-17 ENCOUNTER — APPOINTMENT (OUTPATIENT)
Dept: GENERAL RADIOLOGY | Age: 58
End: 2023-11-17
Attending: PODIATRIST
Payer: COMMERCIAL

## 2023-11-17 ENCOUNTER — ANESTHESIA (OUTPATIENT)
Dept: OPERATING ROOM | Age: 58
End: 2023-11-17
Payer: COMMERCIAL

## 2023-11-17 VITALS
SYSTOLIC BLOOD PRESSURE: 102 MMHG | OXYGEN SATURATION: 94 % | BODY MASS INDEX: 30.48 KG/M2 | DIASTOLIC BLOOD PRESSURE: 61 MMHG | RESPIRATION RATE: 16 BRPM | TEMPERATURE: 97 F | WEIGHT: 225 LBS | HEART RATE: 88 BPM | HEIGHT: 72 IN

## 2023-11-17 DIAGNOSIS — E11.42 DIABETIC POLYNEUROPATHY ASSOCIATED WITH TYPE 2 DIABETES MELLITUS (HCC): ICD-10-CM

## 2023-11-17 DIAGNOSIS — M86.171 ACUTE OSTEOMYELITIS OF RIGHT FOOT (HCC): ICD-10-CM

## 2023-11-17 DIAGNOSIS — L03.031 ABSCESS OF FIFTH TOENAIL OF RIGHT FOOT: ICD-10-CM

## 2023-11-17 LAB
GLUCOSE BLD-MCNC: 187 MG/DL (ref 70–99)
PERFORMED ON: ABNORMAL

## 2023-11-17 PROCEDURE — 88305 TISSUE EXAM BY PATHOLOGIST: CPT

## 2023-11-17 PROCEDURE — 6360000002 HC RX W HCPCS: Performed by: PODIATRIST

## 2023-11-17 PROCEDURE — 7100000010 HC PHASE II RECOVERY - FIRST 15 MIN: Performed by: PODIATRIST

## 2023-11-17 PROCEDURE — 6360000002 HC RX W HCPCS: Performed by: NURSE ANESTHETIST, CERTIFIED REGISTERED

## 2023-11-17 PROCEDURE — 87102 FUNGUS ISOLATION CULTURE: CPT

## 2023-11-17 PROCEDURE — 3700000000 HC ANESTHESIA ATTENDED CARE: Performed by: PODIATRIST

## 2023-11-17 PROCEDURE — 87077 CULTURE AEROBIC IDENTIFY: CPT

## 2023-11-17 PROCEDURE — 87206 SMEAR FLUORESCENT/ACID STAI: CPT

## 2023-11-17 PROCEDURE — 88311 DECALCIFY TISSUE: CPT

## 2023-11-17 PROCEDURE — 2709999900 HC NON-CHARGEABLE SUPPLY: Performed by: PODIATRIST

## 2023-11-17 PROCEDURE — 3600000003 HC SURGERY LEVEL 3 BASE: Performed by: PODIATRIST

## 2023-11-17 PROCEDURE — 7100000011 HC PHASE II RECOVERY - ADDTL 15 MIN: Performed by: PODIATRIST

## 2023-11-17 PROCEDURE — 3600000013 HC SURGERY LEVEL 3 ADDTL 15MIN: Performed by: PODIATRIST

## 2023-11-17 PROCEDURE — 2580000003 HC RX 258: Performed by: PODIATRIST

## 2023-11-17 PROCEDURE — 87075 CULTR BACTERIA EXCEPT BLOOD: CPT

## 2023-11-17 PROCEDURE — A4217 STERILE WATER/SALINE, 500 ML: HCPCS | Performed by: PODIATRIST

## 2023-11-17 PROCEDURE — 87070 CULTURE OTHR SPECIMN AEROBIC: CPT

## 2023-11-17 PROCEDURE — 87116 MYCOBACTERIA CULTURE: CPT

## 2023-11-17 PROCEDURE — 2500000003 HC RX 250 WO HCPCS: Performed by: NURSE ANESTHETIST, CERTIFIED REGISTERED

## 2023-11-17 PROCEDURE — 88304 TISSUE EXAM BY PATHOLOGIST: CPT

## 2023-11-17 PROCEDURE — 87186 SC STD MICRODIL/AGAR DIL: CPT

## 2023-11-17 PROCEDURE — 87205 SMEAR GRAM STAIN: CPT

## 2023-11-17 PROCEDURE — 2580000003 HC RX 258: Performed by: ANESTHESIOLOGY

## 2023-11-17 PROCEDURE — 73620 X-RAY EXAM OF FOOT: CPT

## 2023-11-17 PROCEDURE — 3700000001 HC ADD 15 MINUTES (ANESTHESIA): Performed by: PODIATRIST

## 2023-11-17 RX ORDER — OXYCODONE HYDROCHLORIDE 5 MG/1
5 TABLET ORAL
Status: DISCONTINUED | OUTPATIENT
Start: 2023-11-17 | End: 2023-11-17 | Stop reason: HOSPADM

## 2023-11-17 RX ORDER — BUPIVACAINE HYDROCHLORIDE 5 MG/ML
INJECTION, SOLUTION EPIDURAL; INTRACAUDAL PRN
Status: DISCONTINUED | OUTPATIENT
Start: 2023-11-17 | End: 2023-11-17 | Stop reason: ALTCHOICE

## 2023-11-17 RX ORDER — MAGNESIUM HYDROXIDE 1200 MG/15ML
LIQUID ORAL CONTINUOUS PRN
Status: COMPLETED | OUTPATIENT
Start: 2023-11-17 | End: 2023-11-17

## 2023-11-17 RX ORDER — SODIUM CHLORIDE 0.9 % (FLUSH) 0.9 %
5-40 SYRINGE (ML) INJECTION PRN
Status: DISCONTINUED | OUTPATIENT
Start: 2023-11-17 | End: 2023-11-17 | Stop reason: HOSPADM

## 2023-11-17 RX ORDER — SODIUM CHLORIDE, SODIUM LACTATE, POTASSIUM CHLORIDE, CALCIUM CHLORIDE 600; 310; 30; 20 MG/100ML; MG/100ML; MG/100ML; MG/100ML
INJECTION, SOLUTION INTRAVENOUS CONTINUOUS
Status: DISCONTINUED | OUTPATIENT
Start: 2023-11-17 | End: 2023-11-17 | Stop reason: HOSPADM

## 2023-11-17 RX ORDER — LIDOCAINE HYDROCHLORIDE 10 MG/ML
0.3 INJECTION, SOLUTION EPIDURAL; INFILTRATION; INTRACAUDAL; PERINEURAL
Status: DISCONTINUED | OUTPATIENT
Start: 2023-11-17 | End: 2023-11-17 | Stop reason: HOSPADM

## 2023-11-17 RX ORDER — MEPERIDINE HYDROCHLORIDE 50 MG/ML
12.5 INJECTION INTRAMUSCULAR; INTRAVENOUS; SUBCUTANEOUS EVERY 5 MIN PRN
Status: DISCONTINUED | OUTPATIENT
Start: 2023-11-17 | End: 2023-11-17 | Stop reason: HOSPADM

## 2023-11-17 RX ORDER — OXYCODONE HYDROCHLORIDE 5 MG/1
10 TABLET ORAL PRN
Status: DISCONTINUED | OUTPATIENT
Start: 2023-11-17 | End: 2023-11-17 | Stop reason: HOSPADM

## 2023-11-17 RX ORDER — SODIUM CHLORIDE 9 MG/ML
INJECTION, SOLUTION INTRAVENOUS PRN
Status: DISCONTINUED | OUTPATIENT
Start: 2023-11-17 | End: 2023-11-17 | Stop reason: HOSPADM

## 2023-11-17 RX ORDER — IPRATROPIUM BROMIDE AND ALBUTEROL SULFATE 2.5; .5 MG/3ML; MG/3ML
1 SOLUTION RESPIRATORY (INHALATION)
Status: DISCONTINUED | OUTPATIENT
Start: 2023-11-17 | End: 2023-11-17 | Stop reason: HOSPADM

## 2023-11-17 RX ORDER — SODIUM CHLORIDE 0.9 % (FLUSH) 0.9 %
5-40 SYRINGE (ML) INJECTION EVERY 12 HOURS SCHEDULED
Status: DISCONTINUED | OUTPATIENT
Start: 2023-11-17 | End: 2023-11-17 | Stop reason: HOSPADM

## 2023-11-17 RX ORDER — PROMETHAZINE HYDROCHLORIDE 25 MG/1
25 TABLET ORAL EVERY 6 HOURS PRN
Qty: 28 TABLET | Refills: 0 | Status: SHIPPED | OUTPATIENT
Start: 2023-11-17 | End: 2023-11-24

## 2023-11-17 RX ORDER — PROPOFOL 10 MG/ML
INJECTION, EMULSION INTRAVENOUS PRN
Status: DISCONTINUED | OUTPATIENT
Start: 2023-11-17 | End: 2023-11-17 | Stop reason: SDUPTHER

## 2023-11-17 RX ORDER — LIDOCAINE HYDROCHLORIDE 20 MG/ML
INJECTION, SOLUTION INFILTRATION; PERINEURAL PRN
Status: DISCONTINUED | OUTPATIENT
Start: 2023-11-17 | End: 2023-11-17 | Stop reason: SDUPTHER

## 2023-11-17 RX ORDER — MIDAZOLAM HYDROCHLORIDE 1 MG/ML
INJECTION INTRAMUSCULAR; INTRAVENOUS PRN
Status: DISCONTINUED | OUTPATIENT
Start: 2023-11-17 | End: 2023-11-17 | Stop reason: SDUPTHER

## 2023-11-17 RX ORDER — ONDANSETRON 2 MG/ML
4 INJECTION INTRAMUSCULAR; INTRAVENOUS
Status: DISCONTINUED | OUTPATIENT
Start: 2023-11-17 | End: 2023-11-17 | Stop reason: HOSPADM

## 2023-11-17 RX ORDER — PROCHLORPERAZINE EDISYLATE 5 MG/ML
5 INJECTION INTRAMUSCULAR; INTRAVENOUS
Status: DISCONTINUED | OUTPATIENT
Start: 2023-11-17 | End: 2023-11-17 | Stop reason: HOSPADM

## 2023-11-17 RX ORDER — CEFAZOLIN SODIUM IN 0.9 % NACL 2 G/100 ML
2000 PLASTIC BAG, INJECTION (ML) INTRAVENOUS
Status: COMPLETED | OUTPATIENT
Start: 2023-11-17 | End: 2023-11-17

## 2023-11-17 RX ADMIN — PROPOFOL 30 MG: 10 INJECTION, EMULSION INTRAVENOUS at 10:47

## 2023-11-17 RX ADMIN — Medication 2000 MG: at 10:16

## 2023-11-17 RX ADMIN — PROPOFOL 100 MG: 10 INJECTION, EMULSION INTRAVENOUS at 10:31

## 2023-11-17 RX ADMIN — LIDOCAINE HYDROCHLORIDE 60 MG: 20 INJECTION, SOLUTION INFILTRATION; PERINEURAL at 10:19

## 2023-11-17 RX ADMIN — PROPOFOL 50 MG: 10 INJECTION, EMULSION INTRAVENOUS at 10:19

## 2023-11-17 RX ADMIN — SODIUM CHLORIDE, SODIUM LACTATE, POTASSIUM CHLORIDE, AND CALCIUM CHLORIDE: .6; .31; .03; .02 INJECTION, SOLUTION INTRAVENOUS at 10:04

## 2023-11-17 RX ADMIN — PROPOFOL 100 MG: 10 INJECTION, EMULSION INTRAVENOUS at 10:24

## 2023-11-17 RX ADMIN — MIDAZOLAM 2 MG: 1 INJECTION INTRAMUSCULAR; INTRAVENOUS at 10:16

## 2023-11-17 RX ADMIN — PROPOFOL 50 MG: 10 INJECTION, EMULSION INTRAVENOUS at 10:22

## 2023-11-17 NOTE — BRIEF OP NOTE
Brief Postoperative Note      Patient: Sravatnhi Chew  YOB: 1965  MRN: [id number]    Date of Procedure: 11/17/2023    Pre-op Diagnosis: 1. Acute osteomyelitis, right hallux  2. Diabetes with peripheral neuropathy    Post-Op Diagnosis: Same       Procedure(s):  PARTIAL AMPUTATION RIGHT HALLUX    Surgeon(s):  Lisa Roberts DPM    Assistant:  Surgical Assistant: Joan Armendariz    Anesthesia: Monitor Anesthesia Care    Estimated Blood Loss (mL): Minimal    Complications: None    Specimens:   ID Type Source Tests Collected by Time Destination   1 : distal phalanx right hallux Specimen Toe CULTURE, FUNGUS, CULTURE, SURGICAL, CULTURE WITH SMEAR, ACID FAST [place] Paz [place] 11/17/2023 1032    2 : soft tissue right hallux Specimen Toe CULTURE, FUNGUS, CULTURE, SURGICAL Lisa Roberts DPM 11/17/2023 1042    A : distal phalanx right hallux Specimen Toe SURGICAL PATHOLOGY Lisa Roberts DPM 11/17/2023 1028    B : proximal phalanx right hallux Specimen Toe SURGICAL PATHOLOGY Lisa Roberts DPM 11/17/2023 1040    C : clearance fragment right hallux Specimen Toe SURGICAL PATHOLOGY Lisa Roberts DPM 11/17/2023 1041        Implants:  * No implants in log *      Drains: * No LDAs found *    Findings: Proximal phalanx bone was of normal hardness and color      Electronically signed by Lisa Roberts DPM on 11/17/2023 at 11:04 AM

## 2023-11-17 NOTE — PROGRESS NOTES
Patient to PACU from OR. Report received from Amparo and CRNA. Patient with eyes closed, alert to voice and stable on Room air. Patient denies pain when assessed. SCD's in place; Surgical boot to R foot. VS obtained and filed. Will continue to monitor.

## 2023-11-17 NOTE — DISCHARGE INSTRUCTIONS
POST OPERATIVE INSTRUCTIONS  PLEASE READ CAREFULLY         [x]   Rest    [x]   Elevation of right leg while resting to at least the level of the thigh. [x]   Keep ice bag(s) on right ankle while resting;  (20 minutes on and 40 minutes off). [x]   DO NOT remove or get the bandage wet or dirty. [x]   DO NOT cover bandage with plastic (except for showering). [x]   Walk only as directed:               1.  With surgical shoe as tolerated - Right foot                        [x]  Full Weight Bearing                 2.  To bathroom and meal time                   Increase gradually after 24 hours                      [x]   Call today [phone] ([phone] for [facility]) for follow up appointment in 5   days. [x]   Have your prescriptions filled promptly and take as prescribed. [x]   Do not be alarmed if blood appears on the bandage or if black and blue marks  appear. [x]   If any unusual situation arises- Call Immediately.      C[facility]ella during regular office hours [phone]    Dr. Blanco Aguiar cellphone after regular office hours [phone]

## 2023-11-17 NOTE — ANESTHESIA POSTPROCEDURE EVALUATION
Department of Anesthesiology  Postprocedure Note    Patient: Dave Hinojosa  MRN: [id number]  YOB: 1965  Date of evaluation: 11/17/2023      Procedure Summary     Date: 11/17/23 Room / Location: [facility] OR Anisa / Teri Cifuentes    Anesthesia Start: 7829 Anesthesia Stop: 1101    Procedure: PARTIAL AMPUTATION RIGHT HALLUX (Right: Toes) Diagnosis:       Acute osteomyelitis of right foot ([address])      Abscess of fifth toenail of right foot      Diabetic polyneuropathy associated with type 2 diabetes mellitus (HCC)      (Acute osteomyelitis of right foot ([address]) [M86.171])      (Abscess of fifth toenail of right foot [L03.031])      (Diabetic polyneuropathy associated with type 2 diabetes mellitus ([address]) [E11.42])    Surgeons:  Janeth Torres DPM Responsible Provider: Narcisa Hidalgo MD    Anesthesia Type: General ASA Status: 3          Anesthesia Type: General    Ravindra Phase I: Ravindra Score: 10    Ravindra Phase II: Ravindra Score: 9      Anesthesia Post Evaluation    Patient location during evaluation: PACU  Patient participation: complete - patient participated  Level of consciousness: awake and alert  Airway patency: patent  Nausea & Vomiting: no nausea and no vomiting  Complications: no  Cardiovascular status: hemodynamically stable  Respiratory status: acceptable  Hydration status: euvolemic  Pain management: adequate

## 2023-11-17 NOTE — H&P
I examined the patient. I reviewed the patient's pre-op history and physical, no changes are noted.     Lynn Faye DPM

## 2023-11-18 LAB — LOEFFLER MB STN SPEC: NORMAL

## 2023-11-18 NOTE — PROCEDURES
[facility], [address]                                OPERATIVE REPORT    PATIENT NAME: Stef Irby               :        1965  MED REC NO:   [id number]                          ROOM:  ACCOUNT NO:   [de-identified]                           ADMIT DATE: 2023  PROVIDER:     William Lujan DPM    DATE OF PROCEDURE:  2023    PREOPERATIVE DIAGNOSES:  1. Acute osteomyelitis of right hallux. 2.  Diabetes with peripheral neuropathy. POSTOPERATIVE DIAGNOSES:  1. Acute osteomyelitis of right hallux. 2.  Diabetes with peripheral neuropathy. PROCEDURE PERFORMED:  Partial amputation of right hallux. SURGEON:  Rosemary Izaguirre DPM    ANESTHESIA USED:  MAC with local.    OPERATIVE INDICATIONS:  This patient has been followed in my outpatient  clinic over the past couple of months with diabetic foot infection. The  soft tissue infection was controlled with antibiotics; however,  subsequent MRI demonstrated changes consistent with osteomyelitis of the  distal phalanx of the right hallux. We discussed both the conservative  and surgical treatment options. The patient elected for surgical  treatment. OPERATIVE PROCEDURE:  The patient was brought to the operating room and  placed in the supine position with an IV intact for sedation. MAC  anesthesia was delivered by the Anesthesia department of [facility]. The patient's right hallux was anesthetized using 10  mL of 0.5% bupivacaine plain via ring block around the base of the right  hallux. The patient's right foot, ankle and lower leg were then prepped  and draped in the usual aseptic manner. The lower extremity was  exsanguinated and pneumatic tourniquet at the right calf was inflated  throughout the procedure. Attention was then directed to the right  hallux where severe edema of the toe was noted.   No open lesion or  active soft tissue infection was present. Two converging  semi-elliptical incisions were created around the dorsal and plantar  aspect of the left hallux IPJ. This was immediately deepened down to  the level of the bone. The distal phalanx was then disarticulated from  the remainder of the foot. The distal phalanx was then suctioned using  a sagittal saw into the dorsal and plantar half. One was sent for  general pathology and the other was sent for culture and sensitivity. Aerobic and anaerobic cultures of the toe were then taken. Acid fast  and fungal cultures were taken as well. The sagittal saw was then used  to resect the proximal phalangeal head. I noted that the bone of the  proximal phalanx was noted to be of normal color and hardness. The  resected proximal phalangeal head was sent as a second specimen for  pathology. Finally, a clearance fragment was removed from the remaining  portion of the proximal phalanx using a sagittal saw. This was sent to  Pathology as a general specimen. No purulence was encountered. The  wound was irrigated with normal saline. The long extensor and flexor  tendons were identified and sutured together over the remaining portion  of the proximal phalanx. The capsule was repaired using 2-0 Vicryl. The skin was then gently reapproximated using 2-0 Vicryl and 3-0 nylon. Excellent capillary fill time was noted to the hallux stump. A mildly  compressive dressing was applied to the patient's right foot. Upon  release of the tourniquet, immediate warmth perfusion was noted to have  returned to all remaining digits of the right foot shortly after. There  were no complications encountered during the procedure. All materials  and injectables were as above. All specimens were as above as well.    Estimated blood loss for the procedure was minimal.        William Lujan DPM    D:11/17/2023 11:11:22               T: 11/17/2023 19:35:27      [id number]  Job#: [id number]     Doc#:

## 2023-11-19 LAB
ACID FAST STN SPEC QL: NORMAL
BACTERIA SPEC AEROBE CULT: NORMAL
BACTERIA SPEC AEROBE CULT: NORMAL
BACTERIA SPEC ANAEROBE CULT: NORMAL
BACTERIA SPEC ANAEROBE CULT: NORMAL
GRAM STN SPEC: NORMAL
GRAM STN SPEC: NORMAL
LOEFFLER MB STN SPEC: NORMAL

## 2023-11-22 LAB
BACTERIA SPEC AEROBE CULT: ABNORMAL
BACTERIA SPEC AEROBE CULT: NORMAL
BACTERIA SPEC ANAEROBE CULT: ABNORMAL
BACTERIA SPEC ANAEROBE CULT: NORMAL
GRAM STN SPEC: ABNORMAL
GRAM STN SPEC: NORMAL
ORGANISM: ABNORMAL

## 2023-11-27 LAB
FUNGUS SPEC CULT: NORMAL
FUNGUS SPEC CULT: NORMAL
LOEFFLER MB STN SPEC: NORMAL
LOEFFLER MB STN SPEC: NORMAL

## 2023-11-28 LAB
ACID FAST STN SPEC QL: NORMAL
MYCOBACTERIUM SPEC CULT: NORMAL

## 2023-12-05 LAB
ACID FAST STN SPEC QL: NORMAL
MYCOBACTERIUM SPEC CULT: NORMAL

## 2023-12-12 LAB
ACID FAST STN SPEC QL: NORMAL
MYCOBACTERIUM SPEC CULT: NORMAL

## 2023-12-26 LAB
ACID FAST STN SPEC QL: NORMAL
MYCOBACTERIUM SPEC CULT: NORMAL

## 2024-01-02 LAB
ACID FAST STN SPEC QL: NORMAL
MYCOBACTERIUM SPEC CULT: NORMAL

## (undated) DEVICE — STOCKINETTE,IMPERVIOUS,12X48,STERILE: Brand: MEDLINE

## (undated) DEVICE — SOLUTION IV IRRIG 500ML 0.9% SODIUM CHL 2F7123

## (undated) DEVICE — BANDAGE,GAUZE,CONFORMING,3"X75",STRL,LF: Brand: MEDLINE

## (undated) DEVICE — GOWN,SIRUS,POLYRNF,SETINSLV,L,20/CS: Brand: MEDLINE

## (undated) DEVICE — BLADE,STAINLESS-STEEL,15,STRL,DISPOSABLE: Brand: MEDLINE

## (undated) DEVICE — SUTURE VCRL + SZ 3-0 L27IN ABSRB UD L26MM SH 1/2 CIR VCP416H

## (undated) DEVICE — PADDING CAST W4INXL4YD NONSTERILE COT RAYON MICROPLEATED

## (undated) DEVICE — GLOVE SURG SZ 75 L12IN FNGR THK94MIL STD WHT LTX FREE

## (undated) DEVICE — PACK EXTREMITY XR

## (undated) DEVICE — SYRINGE MED 10ML LUERLOCK TIP W/O SFTY DISP

## (undated) DEVICE — CONTAINER,SPECIMEN,OR STERILE,4OZ: Brand: MEDLINE

## (undated) DEVICE — SUTURE NONABSORBABLE MONOFILAMENT 3-0 PS-1 18 IN BLK ETHILON 1663H

## (undated) DEVICE — NEEDLE HYPO 25GA L1.5IN BLU POLYPR HUB S STL REG BVL STR

## (undated) DEVICE — 1010 S-DRAPE TOWEL DRAPE 10/BX: Brand: STERI-DRAPE™

## (undated) DEVICE — MICRO SAGITTAL BLADE, FINE, 9.5 X 25.5 X 0.4 MM: Brand: CONMED

## (undated) DEVICE — ZIMMER® STERILE DISPOSABLE TOURNIQUET CUFF WITH PLC, DUAL PORT, SINGLE BLADDER, 18 IN. (46 CM)

## (undated) DEVICE — 1810 FOAM BLOCK NEEDLE COUNTER: Brand: DEVON

## (undated) DEVICE — ELECTRODE PT RET AD L9FT HI MOIST COND ADH HYDRGEL CORDED

## (undated) DEVICE — STANDARD HYPODERMIC NEEDLE,POLYPROPYLENE HUB: Brand: MONOJECT

## (undated) DEVICE — SUTURE VCRL + SZ 2-0 L27IN ABSRB WHT SH 1/2 CIR TAPERCUT VCP417H

## (undated) DEVICE — GOWN,SIRUS,NON REINFRCD,LARGE,SET IN SL: Brand: MEDLINE

## (undated) DEVICE — GLOVE SURG SZ 8 L12IN FNGR THK94MIL STD WHT LTX FREE